# Patient Record
Sex: MALE | Race: WHITE | NOT HISPANIC OR LATINO
[De-identification: names, ages, dates, MRNs, and addresses within clinical notes are randomized per-mention and may not be internally consistent; named-entity substitution may affect disease eponyms.]

---

## 2017-01-09 ENCOUNTER — APPOINTMENT (OUTPATIENT)
Dept: ORTHOPEDIC SURGERY | Facility: CLINIC | Age: 72
End: 2017-01-09

## 2017-01-09 VITALS — DIASTOLIC BLOOD PRESSURE: 67 MMHG | SYSTOLIC BLOOD PRESSURE: 127 MMHG | HEART RATE: 74 BPM

## 2017-01-09 VITALS — WEIGHT: 280 LBS | HEIGHT: 73 IN | BODY MASS INDEX: 37.11 KG/M2

## 2017-01-09 DIAGNOSIS — Z78.9 OTHER SPECIFIED HEALTH STATUS: ICD-10-CM

## 2017-01-09 DIAGNOSIS — Z86.39 PERSONAL HISTORY OF OTHER ENDOCRINE, NUTRITIONAL AND METABOLIC DISEASE: ICD-10-CM

## 2017-02-03 ENCOUNTER — APPOINTMENT (OUTPATIENT)
Dept: ORTHOPEDIC SURGERY | Facility: CLINIC | Age: 72
End: 2017-02-03

## 2017-02-03 DIAGNOSIS — M12.9 ARTHROPATHY, UNSPECIFIED: ICD-10-CM

## 2017-02-03 DIAGNOSIS — M12.811 OTHER SPECIFIC ARTHROPATHIES, NOT ELSEWHERE CLASSIFIED, RIGHT SHOULDER: ICD-10-CM

## 2017-02-03 DIAGNOSIS — G89.29 PAIN IN RIGHT SHOULDER: ICD-10-CM

## 2017-02-03 DIAGNOSIS — M25.511 PAIN IN RIGHT SHOULDER: ICD-10-CM

## 2017-02-14 PROBLEM — M25.511 CHRONIC RIGHT SHOULDER PAIN: Status: ACTIVE | Noted: 2017-01-09

## 2017-02-14 PROBLEM — M12.811 ROTATOR CUFF ARTHROPATHY, RIGHT: Status: ACTIVE | Noted: 2017-02-14

## 2017-02-14 PROBLEM — M12.9 SHOULDER ARTHRITIS: Status: ACTIVE | Noted: 2017-01-09

## 2017-03-06 ENCOUNTER — APPOINTMENT (OUTPATIENT)
Dept: HEART AND VASCULAR | Facility: CLINIC | Age: 72
End: 2017-03-06

## 2017-03-06 VITALS — DIASTOLIC BLOOD PRESSURE: 72 MMHG | HEART RATE: 73 BPM | SYSTOLIC BLOOD PRESSURE: 110 MMHG

## 2017-03-06 VITALS — HEART RATE: 73 BPM | DIASTOLIC BLOOD PRESSURE: 74 MMHG | SYSTOLIC BLOOD PRESSURE: 126 MMHG

## 2017-03-06 DIAGNOSIS — Z01.810 ENCOUNTER FOR PREPROCEDURAL CARDIOVASCULAR EXAMINATION: ICD-10-CM

## 2017-03-15 ENCOUNTER — FORM ENCOUNTER (OUTPATIENT)
Age: 72
End: 2017-03-15

## 2017-03-16 ENCOUNTER — OUTPATIENT (OUTPATIENT)
Dept: OUTPATIENT SERVICES | Facility: HOSPITAL | Age: 72
LOS: 1 days | End: 2017-03-16
Payer: MEDICARE

## 2017-03-16 DIAGNOSIS — E78.5 HYPERLIPIDEMIA, UNSPECIFIED: ICD-10-CM

## 2017-03-16 PROCEDURE — 78452 HT MUSCLE IMAGE SPECT MULT: CPT | Mod: 26

## 2017-03-16 PROCEDURE — 93018 CV STRESS TEST I&R ONLY: CPT

## 2017-03-16 PROCEDURE — A9505: CPT

## 2017-03-16 PROCEDURE — 93017 CV STRESS TEST TRACING ONLY: CPT

## 2017-03-16 PROCEDURE — 93016 CV STRESS TEST SUPVJ ONLY: CPT

## 2017-03-16 PROCEDURE — 78452 HT MUSCLE IMAGE SPECT MULT: CPT

## 2017-03-16 PROCEDURE — A9500: CPT

## 2017-03-20 ENCOUNTER — MESSAGE (OUTPATIENT)
Age: 72
End: 2017-03-20

## 2017-04-06 ENCOUNTER — OTHER (OUTPATIENT)
Age: 72
End: 2017-04-06

## 2017-04-24 ENCOUNTER — RX RENEWAL (OUTPATIENT)
Age: 72
End: 2017-04-24

## 2017-04-24 ENCOUNTER — MEDICATION RENEWAL (OUTPATIENT)
Age: 72
End: 2017-04-24

## 2017-05-02 ENCOUNTER — APPOINTMENT (OUTPATIENT)
Dept: HEART AND VASCULAR | Facility: CLINIC | Age: 72
End: 2017-05-02

## 2017-06-08 ENCOUNTER — APPOINTMENT (OUTPATIENT)
Dept: HEART AND VASCULAR | Facility: CLINIC | Age: 72
End: 2017-06-08

## 2017-06-08 VITALS — DIASTOLIC BLOOD PRESSURE: 68 MMHG | SYSTOLIC BLOOD PRESSURE: 118 MMHG | HEART RATE: 70 BPM

## 2017-06-08 VITALS — HEART RATE: 70 BPM | WEIGHT: 282 LBS | HEIGHT: 73 IN | BODY MASS INDEX: 37.37 KG/M2

## 2017-06-12 LAB
25(OH)D3 SERPL-MCNC: 18.3 NG/ML
ALBUMIN SERPL ELPH-MCNC: 4.7 G/DL
ALP BLD-CCNC: 83 U/L
ALT SERPL-CCNC: 21 U/L
ANION GAP SERPL CALC-SCNC: 16 MMOL/L
AST SERPL-CCNC: 23 U/L
BASOPHILS # BLD AUTO: 0.02 K/UL
BASOPHILS NFR BLD AUTO: 0.3 %
BILIRUB SERPL-MCNC: 0.4 MG/DL
BUN SERPL-MCNC: 16 MG/DL
CALCIUM SERPL-MCNC: 10.3 MG/DL
CHLORIDE SERPL-SCNC: 100 MMOL/L
CHOLEST SERPL-MCNC: 171 MG/DL
CHOLEST/HDLC SERPL: 2.9 RATIO
CO2 SERPL-SCNC: 25 MMOL/L
CREAT SERPL-MCNC: 0.93 MG/DL
CRP SERPL HS-MCNC: 2 MG/L
EOSINOPHIL # BLD AUTO: 0.15 K/UL
EOSINOPHIL NFR BLD AUTO: 2.4 %
GLUCOSE SERPL-MCNC: 153 MG/DL
HBA1C MFR BLD HPLC: 7.1 %
HCT VFR BLD CALC: 45.1 %
HDLC SERPL-MCNC: 60 MG/DL
HGB BLD-MCNC: 13.4 G/DL
IMM GRANULOCYTES NFR BLD AUTO: 0.8 %
LDLC SERPL CALC-MCNC: 70 MG/DL
LYMPHOCYTES # BLD AUTO: 0.99 K/UL
LYMPHOCYTES NFR BLD AUTO: 15.8 %
MAGNESIUM RBC-MCNC: 5 MG/DL
MAN DIFF?: NORMAL
MCHC RBC-ENTMCNC: 27.5 PG
MCHC RBC-ENTMCNC: 29.7 GM/DL
MCV RBC AUTO: 92.4 FL
MONOCYTES # BLD AUTO: 0.68 K/UL
MONOCYTES NFR BLD AUTO: 10.9 %
NEUTROPHILS # BLD AUTO: 4.36 K/UL
NEUTROPHILS NFR BLD AUTO: 69.8 %
PLATELET # BLD AUTO: 232 K/UL
POTASSIUM SERPL-SCNC: 5 MMOL/L
PROT SERPL-MCNC: 7.6 G/DL
RBC # BLD: 4.88 M/UL
RBC # FLD: 14.1 %
SODIUM SERPL-SCNC: 141 MMOL/L
T3FREE SERPL-MCNC: 2.69 PG/ML
T4 FREE SERPL-MCNC: 1 NG/DL
TRIGL SERPL-MCNC: 203 MG/DL
TSH SERPL-ACNC: 2.1 UIU/ML
WBC # FLD AUTO: 6.25 K/UL

## 2017-06-27 ENCOUNTER — RX RENEWAL (OUTPATIENT)
Age: 72
End: 2017-06-27

## 2017-08-07 ENCOUNTER — RX RENEWAL (OUTPATIENT)
Age: 72
End: 2017-08-07

## 2017-10-05 ENCOUNTER — APPOINTMENT (OUTPATIENT)
Dept: HEART AND VASCULAR | Facility: CLINIC | Age: 72
End: 2017-10-05
Payer: MEDICARE

## 2017-10-05 VITALS — HEART RATE: 68 BPM | SYSTOLIC BLOOD PRESSURE: 118 MMHG | DIASTOLIC BLOOD PRESSURE: 82 MMHG

## 2017-10-05 VITALS
WEIGHT: 284 LBS | HEIGHT: 73 IN | HEART RATE: 68 BPM | DIASTOLIC BLOOD PRESSURE: 80 MMHG | SYSTOLIC BLOOD PRESSURE: 120 MMHG | BODY MASS INDEX: 37.64 KG/M2

## 2017-10-05 DIAGNOSIS — I77.89 OTHER SPECIFIED DISORDERS OF ARTERIES AND ARTERIOLES: ICD-10-CM

## 2017-10-05 DIAGNOSIS — R00.2 PALPITATIONS: ICD-10-CM

## 2017-10-05 PROCEDURE — 93000 ELECTROCARDIOGRAM COMPLETE: CPT

## 2017-10-05 PROCEDURE — 36415 COLL VENOUS BLD VENIPUNCTURE: CPT

## 2017-10-05 PROCEDURE — 99214 OFFICE O/P EST MOD 30 MIN: CPT | Mod: 25

## 2017-10-10 ENCOUNTER — RESULT REVIEW (OUTPATIENT)
Age: 72
End: 2017-10-10

## 2017-10-10 LAB
25(OH)D3 SERPL-MCNC: 16.2 NG/ML
ALBUMIN SERPL ELPH-MCNC: 4.7 G/DL
ALP BLD-CCNC: 63 U/L
ALT SERPL-CCNC: 13 U/L
ANION GAP SERPL CALC-SCNC: 16 MMOL/L
AST SERPL-CCNC: 18 U/L
BASOPHILS # BLD AUTO: 0.01 K/UL
BASOPHILS NFR BLD AUTO: 0.2 %
BILIRUB SERPL-MCNC: 0.6 MG/DL
BUN SERPL-MCNC: 18 MG/DL
CALCIUM SERPL-MCNC: 10.2 MG/DL
CHLORIDE SERPL-SCNC: 100 MMOL/L
CHOLEST SERPL-MCNC: 166 MG/DL
CHOLEST/HDLC SERPL: 2.8 RATIO
CO2 SERPL-SCNC: 27 MMOL/L
CREAT SERPL-MCNC: 0.98 MG/DL
CRP SERPL HS-MCNC: 1.4 MG/L
EOSINOPHIL # BLD AUTO: 0.14 K/UL
EOSINOPHIL NFR BLD AUTO: 3.3 %
FOLATE SERPL-MCNC: 8 NG/ML
GLUCOSE SERPL-MCNC: 168 MG/DL
HBA1C MFR BLD HPLC: 7.9 %
HCT VFR BLD CALC: 46 %
HDLC SERPL-MCNC: 59 MG/DL
HGB BLD-MCNC: 14.2 G/DL
IMM GRANULOCYTES NFR BLD AUTO: 0.5 %
LDLC SERPL CALC-MCNC: 60 MG/DL
LYMPHOCYTES # BLD AUTO: 1.03 K/UL
LYMPHOCYTES NFR BLD AUTO: 24.5 %
MAGNESIUM RBC-MCNC: 5.2 MG/DL
MAN DIFF?: NORMAL
MCHC RBC-ENTMCNC: 27.6 PG
MCHC RBC-ENTMCNC: 30.9 GM/DL
MCV RBC AUTO: 89.5 FL
MONOCYTES # BLD AUTO: 0.39 K/UL
MONOCYTES NFR BLD AUTO: 9.3 %
NEUTROPHILS # BLD AUTO: 2.62 K/UL
NEUTROPHILS NFR BLD AUTO: 62.2 %
PLATELET # BLD AUTO: 195 K/UL
POTASSIUM SERPL-SCNC: 4.7 MMOL/L
PROT SERPL-MCNC: 7.6 G/DL
RBC # BLD: 5.14 M/UL
RBC # FLD: 13.5 %
SODIUM SERPL-SCNC: 143 MMOL/L
T3FREE SERPL-MCNC: 2.85 PG/ML
T4 FREE SERPL-MCNC: 1 NG/DL
TRIGL SERPL-MCNC: 233 MG/DL
TSH SERPL-ACNC: 2.75 UIU/ML
VIT B12 SERPL-MCNC: 347 PG/ML
WBC # FLD AUTO: 4.21 K/UL

## 2017-10-18 ENCOUNTER — APPOINTMENT (OUTPATIENT)
Dept: ENDOCRINOLOGY | Facility: CLINIC | Age: 72
End: 2017-10-18
Payer: MEDICARE

## 2017-10-18 VITALS
HEART RATE: 82 BPM | DIASTOLIC BLOOD PRESSURE: 72 MMHG | SYSTOLIC BLOOD PRESSURE: 118 MMHG | BODY MASS INDEX: 38.7 KG/M2 | HEIGHT: 73 IN | WEIGHT: 292 LBS

## 2017-10-18 DIAGNOSIS — B35.1 TINEA UNGUIUM: ICD-10-CM

## 2017-10-18 PROCEDURE — 99203 OFFICE O/P NEW LOW 30 MIN: CPT | Mod: 25,GC

## 2017-10-18 PROCEDURE — G0008: CPT

## 2017-10-18 PROCEDURE — 90662 IIV NO PRSV INCREASED AG IM: CPT

## 2017-10-31 ENCOUNTER — APPOINTMENT (OUTPATIENT)
Dept: ENDOCRINOLOGY | Facility: CLINIC | Age: 72
End: 2017-10-31
Payer: MEDICARE

## 2017-10-31 ENCOUNTER — APPOINTMENT (OUTPATIENT)
Dept: ENDOCRINOLOGY | Facility: CLINIC | Age: 72
End: 2017-10-31

## 2017-10-31 VITALS
SYSTOLIC BLOOD PRESSURE: 127 MMHG | WEIGHT: 291 LBS | BODY MASS INDEX: 38.57 KG/M2 | DIASTOLIC BLOOD PRESSURE: 80 MMHG | HEIGHT: 73 IN | HEART RATE: 81 BPM

## 2017-10-31 PROCEDURE — 99211 OFF/OP EST MAY X REQ PHY/QHP: CPT | Mod: 25

## 2017-10-31 PROCEDURE — 82962 GLUCOSE BLOOD TEST: CPT

## 2017-11-01 RX ORDER — BLOOD-GLUCOSE METER
W/DEVICE EACH MISCELLANEOUS
Qty: 1 | Refills: 0 | Status: ACTIVE | COMMUNITY
Start: 2017-11-01 | End: 1900-01-01

## 2017-11-02 LAB
CREAT SPEC-SCNC: 115 MG/DL
MICROALBUMIN 24H UR DL<=1MG/L-MCNC: 2.5 MG/DL
MICROALBUMIN/CREAT 24H UR-RTO: 22 MG/G

## 2017-12-04 ENCOUNTER — APPOINTMENT (OUTPATIENT)
Dept: ENDOCRINOLOGY | Facility: CLINIC | Age: 72
End: 2017-12-04

## 2018-01-24 ENCOUNTER — APPOINTMENT (OUTPATIENT)
Dept: ENDOCRINOLOGY | Facility: CLINIC | Age: 73
End: 2018-01-24
Payer: MEDICARE

## 2018-01-24 VITALS
WEIGHT: 265.31 LBS | HEART RATE: 85 BPM | BODY MASS INDEX: 35.16 KG/M2 | SYSTOLIC BLOOD PRESSURE: 101 MMHG | HEIGHT: 73 IN | DIASTOLIC BLOOD PRESSURE: 61 MMHG

## 2018-01-24 PROCEDURE — 99214 OFFICE O/P EST MOD 30 MIN: CPT | Mod: 25,GC

## 2018-01-24 PROCEDURE — 83036 HEMOGLOBIN GLYCOSYLATED A1C: CPT | Mod: QW

## 2018-01-24 RX ORDER — CHOLECALCIFEROL (VITAMIN D3) 1250 MCG
1.25 MG CAPSULE ORAL
Qty: 8 | Refills: 0 | Status: ACTIVE | COMMUNITY
Start: 2018-01-24 | End: 1900-01-01

## 2018-01-25 ENCOUNTER — RESULT CHARGE (OUTPATIENT)
Age: 73
End: 2018-01-25

## 2018-01-26 LAB — HBA1C MFR BLD HPLC: 6.4

## 2018-02-01 ENCOUNTER — APPOINTMENT (OUTPATIENT)
Dept: HEART AND VASCULAR | Facility: CLINIC | Age: 73
End: 2018-02-01
Payer: MEDICARE

## 2018-02-01 VITALS — DIASTOLIC BLOOD PRESSURE: 64 MMHG | HEART RATE: 81 BPM | SYSTOLIC BLOOD PRESSURE: 118 MMHG

## 2018-02-01 VITALS
DIASTOLIC BLOOD PRESSURE: 68 MMHG | SYSTOLIC BLOOD PRESSURE: 100 MMHG | HEART RATE: 81 BPM | HEIGHT: 73 IN | WEIGHT: 261 LBS | BODY MASS INDEX: 34.59 KG/M2

## 2018-02-01 DIAGNOSIS — Z00.00 ENCOUNTER FOR GENERAL ADULT MEDICAL EXAMINATION W/OUT ABNORMAL FINDINGS: ICD-10-CM

## 2018-02-01 DIAGNOSIS — I47.1 SUPRAVENTRICULAR TACHYCARDIA: ICD-10-CM

## 2018-02-01 PROCEDURE — 93000 ELECTROCARDIOGRAM COMPLETE: CPT

## 2018-02-01 PROCEDURE — 36415 COLL VENOUS BLD VENIPUNCTURE: CPT

## 2018-02-01 PROCEDURE — 99214 OFFICE O/P EST MOD 30 MIN: CPT | Mod: 25

## 2018-02-02 LAB
25(OH)D3 SERPL-MCNC: 22.6 NG/ML
ALBUMIN SERPL ELPH-MCNC: 4.9 G/DL
ALP BLD-CCNC: 60 U/L
ALT SERPL-CCNC: 13 U/L
ANION GAP SERPL CALC-SCNC: 21 MMOL/L
AST SERPL-CCNC: 18 U/L
BASOPHILS # BLD AUTO: 0.02 K/UL
BASOPHILS NFR BLD AUTO: 0.4 %
BILIRUB SERPL-MCNC: 0.8 MG/DL
BUN SERPL-MCNC: 18 MG/DL
CALCIUM SERPL-MCNC: 10.4 MG/DL
CHLORIDE SERPL-SCNC: 99 MMOL/L
CHOLEST SERPL-MCNC: 138 MG/DL
CHOLEST/HDLC SERPL: 2.1 RATIO
CO2 SERPL-SCNC: 23 MMOL/L
CREAT SERPL-MCNC: 1.14 MG/DL
EOSINOPHIL # BLD AUTO: 0.17 K/UL
EOSINOPHIL NFR BLD AUTO: 3.3 %
FOLATE SERPL-MCNC: 6.7 NG/ML
GLUCOSE SERPL-MCNC: 98 MG/DL
HCT VFR BLD CALC: 46.3 %
HDLC SERPL-MCNC: 65 MG/DL
HGB BLD-MCNC: 14.5 G/DL
IMM GRANULOCYTES NFR BLD AUTO: 0.4 %
LDLC SERPL CALC-MCNC: 44 MG/DL
LYMPHOCYTES # BLD AUTO: 1.04 K/UL
LYMPHOCYTES NFR BLD AUTO: 20.3 %
MAN DIFF?: NORMAL
MCHC RBC-ENTMCNC: 27.5 PG
MCHC RBC-ENTMCNC: 31.3 GM/DL
MCV RBC AUTO: 87.9 FL
MONOCYTES # BLD AUTO: 0.61 K/UL
MONOCYTES NFR BLD AUTO: 11.9 %
NEUTROPHILS # BLD AUTO: 3.26 K/UL
NEUTROPHILS NFR BLD AUTO: 63.7 %
PLATELET # BLD AUTO: 209 K/UL
POTASSIUM SERPL-SCNC: 5 MMOL/L
PROT SERPL-MCNC: 7.7 G/DL
RBC # BLD: 5.27 M/UL
RBC # FLD: 14.9 %
SODIUM SERPL-SCNC: 143 MMOL/L
TRIGL SERPL-MCNC: 145 MG/DL
VIT B12 SERPL-MCNC: 290 PG/ML
WBC # FLD AUTO: 5.12 K/UL

## 2018-02-05 ENCOUNTER — RX RENEWAL (OUTPATIENT)
Age: 73
End: 2018-02-05

## 2018-02-05 LAB — MAGNESIUM RBC-MCNC: 5.1 MG/DL

## 2018-04-03 ENCOUNTER — OUTPATIENT (OUTPATIENT)
Dept: OUTPATIENT SERVICES | Facility: HOSPITAL | Age: 73
LOS: 1 days | End: 2018-04-03
Payer: MEDICARE

## 2018-04-03 DIAGNOSIS — R33.9 RETENTION OF URINE, UNSPECIFIED: ICD-10-CM

## 2018-04-03 DIAGNOSIS — Z01.818 ENCOUNTER FOR OTHER PREPROCEDURAL EXAMINATION: ICD-10-CM

## 2018-04-03 DIAGNOSIS — N40.1 BENIGN PROSTATIC HYPERPLASIA WITH LOWER URINARY TRACT SYMPTOMS: ICD-10-CM

## 2018-04-03 DIAGNOSIS — I10 ESSENTIAL (PRIMARY) HYPERTENSION: ICD-10-CM

## 2018-04-03 DIAGNOSIS — N13.8 OTHER OBSTRUCTIVE AND REFLUX UROPATHY: ICD-10-CM

## 2018-04-03 PROCEDURE — G0463: CPT

## 2018-04-03 PROCEDURE — 85027 COMPLETE CBC AUTOMATED: CPT

## 2018-04-03 PROCEDURE — 80048 BASIC METABOLIC PNL TOTAL CA: CPT

## 2018-04-03 PROCEDURE — 81003 URINALYSIS AUTO W/O SCOPE: CPT

## 2018-04-03 PROCEDURE — 86900 BLOOD TYPING SEROLOGIC ABO: CPT

## 2018-04-03 PROCEDURE — 86850 RBC ANTIBODY SCREEN: CPT

## 2018-04-03 PROCEDURE — 36415 COLL VENOUS BLD VENIPUNCTURE: CPT

## 2018-04-03 PROCEDURE — 87086 URINE CULTURE/COLONY COUNT: CPT

## 2018-04-15 ENCOUNTER — TRANSCRIPTION ENCOUNTER (OUTPATIENT)
Age: 73
End: 2018-04-15

## 2018-04-16 ENCOUNTER — OUTPATIENT (OUTPATIENT)
Dept: OUTPATIENT SERVICES | Facility: HOSPITAL | Age: 73
LOS: 1 days | End: 2018-04-16
Payer: MEDICARE

## 2018-04-16 DIAGNOSIS — N40.1 BENIGN PROSTATIC HYPERPLASIA WITH LOWER URINARY TRACT SYMPTOMS: ICD-10-CM

## 2018-04-16 DIAGNOSIS — R33.9 RETENTION OF URINE, UNSPECIFIED: ICD-10-CM

## 2018-04-16 DIAGNOSIS — N13.8 OTHER OBSTRUCTIVE AND REFLUX UROPATHY: ICD-10-CM

## 2018-04-16 PROCEDURE — 86901 BLOOD TYPING SEROLOGIC RH(D): CPT

## 2018-04-16 PROCEDURE — 86900 BLOOD TYPING SEROLOGIC ABO: CPT

## 2018-04-16 PROCEDURE — 52630 REMOVE PROSTATE REGROWTH: CPT

## 2018-05-09 ENCOUNTER — APPOINTMENT (OUTPATIENT)
Dept: ENDOCRINOLOGY | Facility: CLINIC | Age: 73
End: 2018-05-09
Payer: MEDICARE

## 2018-05-09 VITALS — WEIGHT: 250 LBS | BODY MASS INDEX: 32.98 KG/M2

## 2018-05-09 PROCEDURE — 99214 OFFICE O/P EST MOD 30 MIN: CPT | Mod: GC

## 2018-05-21 ENCOUNTER — RX RENEWAL (OUTPATIENT)
Age: 73
End: 2018-05-21

## 2018-06-27 ENCOUNTER — RX RENEWAL (OUTPATIENT)
Age: 73
End: 2018-06-27

## 2018-07-05 ENCOUNTER — RX RENEWAL (OUTPATIENT)
Age: 73
End: 2018-07-05

## 2018-09-05 ENCOUNTER — APPOINTMENT (OUTPATIENT)
Dept: ENDOCRINOLOGY | Facility: CLINIC | Age: 73
End: 2018-09-05

## 2018-09-05 ENCOUNTER — RESULT CHARGE (OUTPATIENT)
Age: 73
End: 2018-09-05

## 2018-09-05 VITALS
BODY MASS INDEX: 31.66 KG/M2 | WEIGHT: 240 LBS | DIASTOLIC BLOOD PRESSURE: 81 MMHG | SYSTOLIC BLOOD PRESSURE: 124 MMHG | HEART RATE: 84 BPM

## 2018-09-11 LAB
GLUCOSE BLDC GLUCOMTR-MCNC: 104
HBA1C MFR BLD HPLC: 5.9

## 2018-09-19 ENCOUNTER — APPOINTMENT (OUTPATIENT)
Dept: ENDOCRINOLOGY | Facility: CLINIC | Age: 73
End: 2018-09-19
Payer: MEDICARE

## 2018-09-19 ENCOUNTER — RESULT CHARGE (OUTPATIENT)
Age: 73
End: 2018-09-19

## 2018-09-19 VITALS
HEIGHT: 73 IN | BODY MASS INDEX: 32.34 KG/M2 | SYSTOLIC BLOOD PRESSURE: 118 MMHG | DIASTOLIC BLOOD PRESSURE: 74 MMHG | WEIGHT: 244 LBS | HEART RATE: 86 BPM

## 2018-09-19 LAB — GLUCOSE BLDC GLUCOMTR-MCNC: 95

## 2018-09-19 PROCEDURE — 99214 OFFICE O/P EST MOD 30 MIN: CPT | Mod: 25,GC

## 2018-09-19 PROCEDURE — 82962 GLUCOSE BLOOD TEST: CPT

## 2018-12-19 ENCOUNTER — APPOINTMENT (OUTPATIENT)
Dept: ENDOCRINOLOGY | Facility: CLINIC | Age: 73
End: 2018-12-19
Payer: MEDICARE

## 2018-12-19 ENCOUNTER — RESULT CHARGE (OUTPATIENT)
Age: 73
End: 2018-12-19

## 2018-12-19 VITALS
HEART RATE: 83 BPM | SYSTOLIC BLOOD PRESSURE: 134 MMHG | DIASTOLIC BLOOD PRESSURE: 83 MMHG | HEIGHT: 73 IN | WEIGHT: 240 LBS | BODY MASS INDEX: 31.81 KG/M2

## 2018-12-19 DIAGNOSIS — E55.9 VITAMIN D DEFICIENCY, UNSPECIFIED: ICD-10-CM

## 2018-12-19 LAB
GLUCOSE BLDC GLUCOMTR-MCNC: 108
HBA1C MFR BLD HPLC: 5.6

## 2018-12-19 PROCEDURE — 83036 HEMOGLOBIN GLYCOSYLATED A1C: CPT | Mod: QW

## 2018-12-19 PROCEDURE — 99214 OFFICE O/P EST MOD 30 MIN: CPT | Mod: 25,GC

## 2018-12-19 PROCEDURE — 82962 GLUCOSE BLOOD TEST: CPT

## 2018-12-21 LAB
25(OH)D3 SERPL-MCNC: 25.7 NG/ML
ANION GAP SERPL CALC-SCNC: 14 MMOL/L
BUN SERPL-MCNC: 16 MG/DL
CALCIUM SERPL-MCNC: 10 MG/DL
CHLORIDE SERPL-SCNC: 97 MMOL/L
CHOLEST SERPL-MCNC: 117 MG/DL
CHOLEST/HDLC SERPL: 1.9 RATIO
CO2 SERPL-SCNC: 27 MMOL/L
CREAT SERPL-MCNC: 0.81 MG/DL
CREAT SPEC-SCNC: 86 MG/DL
GLUCOSE SERPL-MCNC: 94 MG/DL
HDLC SERPL-MCNC: 61 MG/DL
LDLC SERPL CALC-MCNC: 35 MG/DL
MICROALBUMIN 24H UR DL<=1MG/L-MCNC: <1.2 MG/DL
MICROALBUMIN/CREAT 24H UR-RTO: NORMAL
POTASSIUM SERPL-SCNC: 4.7 MMOL/L
SODIUM SERPL-SCNC: 138 MMOL/L
TRIGL SERPL-MCNC: 103 MG/DL

## 2019-01-14 ENCOUNTER — MEDICATION RENEWAL (OUTPATIENT)
Age: 74
End: 2019-01-14

## 2019-01-14 RX ORDER — LANCETS
EACH MISCELLANEOUS
Qty: 1 | Refills: 5 | Status: ACTIVE | COMMUNITY
Start: 2017-11-01 | End: 1900-01-01

## 2019-01-15 ENCOUNTER — RX RENEWAL (OUTPATIENT)
Age: 74
End: 2019-01-15

## 2019-03-05 ENCOUNTER — RX RENEWAL (OUTPATIENT)
Age: 74
End: 2019-03-05

## 2019-03-20 ENCOUNTER — APPOINTMENT (OUTPATIENT)
Dept: ENDOCRINOLOGY | Facility: CLINIC | Age: 74
End: 2019-03-20
Payer: MEDICARE

## 2019-03-20 ENCOUNTER — RESULT CHARGE (OUTPATIENT)
Age: 74
End: 2019-03-20

## 2019-03-20 VITALS
SYSTOLIC BLOOD PRESSURE: 110 MMHG | WEIGHT: 231 LBS | HEART RATE: 83 BPM | DIASTOLIC BLOOD PRESSURE: 71 MMHG | BODY MASS INDEX: 30.48 KG/M2

## 2019-03-20 LAB
GLUCOSE BLDC GLUCOMTR-MCNC: 99
HBA1C MFR BLD HPLC: 5.4

## 2019-03-20 PROCEDURE — 99214 OFFICE O/P EST MOD 30 MIN: CPT | Mod: 25,GC

## 2019-03-20 PROCEDURE — 82962 GLUCOSE BLOOD TEST: CPT

## 2019-03-20 PROCEDURE — 83036 HEMOGLOBIN GLYCOSYLATED A1C: CPT | Mod: QW

## 2019-03-22 NOTE — END OF VISIT
[FreeTextEntry3] : DM since 2912?\par Pat underwent gastric sleeve, restarted metformin and started GLP1 Oct 2017\par Jan 2018: 265, and March 2019 231. A1c 5.6 % 12/18\par A1c target 6.5% in lower consider strongly to d/c GLP1 [Time Spent: ___ minutes] : I have spent [unfilled] minutes of face to face time with the patient

## 2019-03-22 NOTE — HISTORY OF PRESENT ILLNESS
[FreeTextEntry1] : 72 year male with DM2, obesity presents for follow-up. Overall he has lost 61 lbs.\par \par Current regimen: Metformin 1000 mg BID, Trulicity 1.5 weekly\par \par Pt checks FSG         {1         } times per day:\par \par FSG:\par Pre-breakfast: 84-110s\par Hypoglycemia: none\par \par \par Diet: low carb, high protein, smaller portions\par \par \par Urine micro: WNL 12/08/18 \par lipid profile: LDL 35 on Simvastatin 40 mg 12/08/18\par last hba1c:5.4% 3/2019 5.6%  12/2018  5.9%  9/19/18 6.4% 01/25/2018  7.9 10/05/17\par eye exam: UTD 8/2018\par diabetic foot exam/podiatry: WNL 10/20/17\par

## 2019-03-22 NOTE — ASSESSMENT
[FreeTextEntry1] : A/P: 72 year male with DM2, obesity presents for follow-up.\par \par 1.DM 2- Patient is below goal with his A1C with no episodes of hypoglycemia.. Rec cont Metformin 1000 mg BID and cont Trulicity 1.5/0.5 mg/mL weekly only bc he is cont to lose weight\par \par 2. Obesity- Lost 61 lbs and is tolerating. Cont Trulicity\par \par 3. HLD- LDL within goal cont Simvastatin 40 mg QD\par \par RTC in 3 \par

## 2019-03-22 NOTE — PHYSICAL EXAM
[Alert] : alert [No Acute Distress] : no acute distress [PERRL] : pupils equal, round and reactive to light [EOMI] : extra ocular movement intact [Normal Lips/Gums] : the lips and gums were normal [Supple] : the neck was supple [No Respiratory Distress] : no respiratory distress [Normal Rate and Effort] : normal respiratory rhythm and effort [Clear to Auscultation] : lungs were clear to auscultation bilaterally [Normal Rate] : heart rate was normal  [Normal S1, S2] : normal S1 and S2 [Regular Rhythm] : with a regular rhythm [No Edema] : there was no peripheral edema [Normal Bowel Sounds] : normal bowel sounds [Not Tender] : non-tender [Soft] : abdomen soft [Not Distended] : not distended [No Joint Swelling] : no joint swelling seen [No Rash] : no rash [Oriented x3] : oriented to person, place, and time [de-identified] : overweight

## 2019-07-19 ENCOUNTER — APPOINTMENT (OUTPATIENT)
Dept: ENDOCRINOLOGY | Facility: CLINIC | Age: 74
End: 2019-07-19
Payer: MEDICARE

## 2019-07-19 VITALS
SYSTOLIC BLOOD PRESSURE: 106 MMHG | HEART RATE: 74 BPM | BODY MASS INDEX: 29.69 KG/M2 | HEIGHT: 73 IN | DIASTOLIC BLOOD PRESSURE: 69 MMHG | WEIGHT: 224 LBS

## 2019-07-19 LAB
GLUCOSE BLDC GLUCOMTR-MCNC: 86
HBA1C MFR BLD HPLC: 5.7

## 2019-07-19 PROCEDURE — 99214 OFFICE O/P EST MOD 30 MIN: CPT | Mod: 25,GC

## 2019-07-19 PROCEDURE — 83036 HEMOGLOBIN GLYCOSYLATED A1C: CPT | Mod: QW

## 2019-07-19 PROCEDURE — 82962 GLUCOSE BLOOD TEST: CPT

## 2019-07-19 PROCEDURE — 36415 COLL VENOUS BLD VENIPUNCTURE: CPT

## 2019-07-29 ENCOUNTER — MEDICATION RENEWAL (OUTPATIENT)
Age: 74
End: 2019-07-29

## 2019-07-29 LAB
25(OH)D3 SERPL-MCNC: 26 NG/ML
ALBUMIN SERPL ELPH-MCNC: 4.9 G/DL
ALP BLD-CCNC: 52 U/L
ALT SERPL-CCNC: 11 U/L
ANION GAP SERPL CALC-SCNC: 13 MMOL/L
AST SERPL-CCNC: 18 U/L
BASOPHILS # BLD AUTO: 0.03 K/UL
BASOPHILS NFR BLD AUTO: 0.6 %
BILIRUB SERPL-MCNC: 0.6 MG/DL
BUN SERPL-MCNC: 23 MG/DL
CALCIUM SERPL-MCNC: 10 MG/DL
CHLORIDE SERPL-SCNC: 101 MMOL/L
CHOLEST SERPL-MCNC: 121 MG/DL
CHOLEST/HDLC SERPL: 2.2 RATIO
CO2 SERPL-SCNC: 26 MMOL/L
CREAT SERPL-MCNC: 0.86 MG/DL
CREAT SPEC-SCNC: 206 MG/DL
EOSINOPHIL # BLD AUTO: 0.16 K/UL
EOSINOPHIL NFR BLD AUTO: 3.1 %
GLUCOSE SERPL-MCNC: 84 MG/DL
HCT VFR BLD CALC: 45.1 %
HDLC SERPL-MCNC: 56 MG/DL
HGB BLD-MCNC: 13.6 G/DL
IMM GRANULOCYTES NFR BLD AUTO: 0.4 %
LDLC SERPL CALC-MCNC: 40 MG/DL
LYMPHOCYTES # BLD AUTO: 1.05 K/UL
LYMPHOCYTES NFR BLD AUTO: 20.7 %
MAN DIFF?: NORMAL
MCHC RBC-ENTMCNC: 27.7 PG
MCHC RBC-ENTMCNC: 30.2 GM/DL
MCV RBC AUTO: 91.9 FL
MICROALBUMIN 24H UR DL<=1MG/L-MCNC: 2.6 MG/DL
MICROALBUMIN/CREAT 24H UR-RTO: 13 MG/G
MONOCYTES # BLD AUTO: 0.56 K/UL
MONOCYTES NFR BLD AUTO: 11 %
NEUTROPHILS # BLD AUTO: 3.26 K/UL
NEUTROPHILS NFR BLD AUTO: 64.2 %
PLATELET # BLD AUTO: 195 K/UL
POTASSIUM SERPL-SCNC: 5.3 MMOL/L
PROT SERPL-MCNC: 7.2 G/DL
RBC # BLD: 4.91 M/UL
RBC # FLD: 13.3 %
SODIUM SERPL-SCNC: 140 MMOL/L
TRIGL SERPL-MCNC: 123 MG/DL
WBC # FLD AUTO: 5.08 K/UL

## 2019-10-30 ENCOUNTER — APPOINTMENT (OUTPATIENT)
Dept: ENDOCRINOLOGY | Facility: CLINIC | Age: 74
End: 2019-10-30
Payer: MEDICARE

## 2019-10-30 VITALS
WEIGHT: 231 LBS | SYSTOLIC BLOOD PRESSURE: 109 MMHG | HEART RATE: 82 BPM | BODY MASS INDEX: 30.48 KG/M2 | DIASTOLIC BLOOD PRESSURE: 72 MMHG

## 2019-10-30 PROCEDURE — 99215 OFFICE O/P EST HI 40 MIN: CPT | Mod: 25

## 2019-10-30 PROCEDURE — 82962 GLUCOSE BLOOD TEST: CPT

## 2019-11-01 NOTE — PHYSICAL EXAM
[Alert] : alert [No Acute Distress] : no acute distress [PERRL] : pupils equal, round and reactive to light [EOMI] : extra ocular movement intact [Normal Lips/Gums] : the lips and gums were normal [Supple] : the neck was supple [No Respiratory Distress] : no respiratory distress [Normal Rate and Effort] : normal respiratory rhythm and effort [Clear to Auscultation] : lungs were clear to auscultation bilaterally [Normal Rate] : heart rate was normal  [Normal S1, S2] : normal S1 and S2 [Regular Rhythm] : with a regular rhythm [No Edema] : there was no peripheral edema [Normal Bowel Sounds] : normal bowel sounds [Not Tender] : non-tender [Soft] : abdomen soft [Not Distended] : not distended [No Joint Swelling] : no joint swelling seen [No Rash] : no rash [Oriented x3] : oriented to person, place, and time [de-identified] : overweight [de-identified] : pulses present

## 2019-11-01 NOTE — ADDENDUM
[FreeTextEntry1] : I, Amor Benjamin, acted solely as a scribe for Dr. Mk Lees on this date. 10/30/2019.

## 2019-11-01 NOTE — ASSESSMENT
[Importance of Diet and Exercise] : importance of diet and exercise to improve glycemic control, achieve weight loss and improve cardiovascular health [Self Monitoring of Blood Glucose] : self monitoring of blood glucose [FreeTextEntry1] : 74 y/o M with,\par \par 1. Hx of T2DM dx ~ 10 years ago with no know DM related complications, no symptoms of osmotic diuresis, and recent A1c 5.7%:\par Over the past 4 years, the highest A1c was 7.9% in 10/2017, which occurred once.\par Update labs today. Pt will continue with Trulicity.\par \par 2. Hx of Obesity, pt underwent bariatric surgery 10-15 years ago:\par In 10/2017 pt was not on Trulicity with a weight of 292, today he is 231, lost ~60 lbs. Pt will continue with Metformin and Trulciity.\par Sent for Vit D, metabolic, and electrolyte labs.\par \par f/u with his internist, f/u with me in 6 months.

## 2019-11-01 NOTE — HISTORY OF PRESENT ILLNESS
[FreeTextEntry1] : 12/2018: A1c 5.6%, LDL-c 35\par 3/20/19: POCT A1c 5.4%\par 7/19/19: POCT A1c 5.7%, Vit D 24 OH 26.0, microalb/creat ratio 13, LDL-c 40, s. creat 0.86\par \par 74 y/o M, /72, BMI 30.48, with a hx of T2DM (Dx ~10 years ago) on medication for ~2 years, presents for follow-up. Limited info on DM related complications with increasing A1c since 2014. Known to endocrine clinic.\par Other PMHx: Underwent bariatric surgery, Obesity, and HLD, no Hx of CAD. Denies MI and CHF. Gastric bypass sleeve (>15 years)\par Diet: low carb, high protein, smaller portions\par Pt checks FSG once per day\par eye exam: "couple months ago"\par diabetic foot exam/podiatry: WNL 10/20/17\par Pt is currently following with a cardiologist and an urologist.\par \par 10/30/2019 \par Pt presents today for a DM f/u, feeling well and is asymptomatic. He denies blurry vision, chest pain, palpitation, and pins and needle sensations in the lower extremities. Pt notes he has low appetite but his weight is increasing. Pt gained 7lbs since his last visit in 7/19/19, his  POCT today is 93.\par \par Current Medication; Metformin 500 mg BID, Trulicity 1.5 mg weekly, Simvastatin 40 mg QD, Verapamil

## 2019-11-01 NOTE — REVIEW OF SYSTEMS
[As Noted in HPI] : as noted in HPI [Recent Weight Gain (___ Lbs)] : recent [unfilled] ~Ulb weight gain [Negative] : Constitutional [Blurry Vision] : no blurred vision [Chest Pain] : no chest pain [Palpitations] : no palpitations [FreeTextEntry2] : low appetite  [de-identified] : denies pins and needle sensations in the lower extremities

## 2019-11-01 NOTE — END OF VISIT
[FreeTextEntry3] : All medical record entries made by the Scribe were at my, Dr. Mk Lees, direction and personally dictated by me on 10/30/2019 I have reviewed the chart and agree that the record accurately reflects my personal performance of the history, physical exam, assessment and plan. I have also personally directed, reviewed and agreed with the chart.  [>50% of Time Spent on Counseling for ____] : Greater than 50% of the encounter time was spent on counseling for [unfilled] [Time Spent: ___ minutes] : I have spent [unfilled] minutes of face to face time with the patient

## 2019-11-04 LAB
ANION GAP SERPL CALC-SCNC: 13 MMOL/L
BUN SERPL-MCNC: 19 MG/DL
CALCIUM SERPL-MCNC: 10.3 MG/DL
CHLORIDE SERPL-SCNC: 100 MMOL/L
CO2 SERPL-SCNC: 27 MMOL/L
CREAT SERPL-MCNC: 0.92 MG/DL
ESTIMATED AVERAGE GLUCOSE: 108 MG/DL
GLUCOSE BLDC GLUCOMTR-MCNC: 93
GLUCOSE SERPL-MCNC: 97 MG/DL
HBA1C MFR BLD HPLC: 5.4 %
POTASSIUM SERPL-SCNC: 4.8 MMOL/L
SODIUM SERPL-SCNC: 140 MMOL/L

## 2019-11-18 ENCOUNTER — APPOINTMENT (OUTPATIENT)
Dept: ENDOCRINOLOGY | Facility: CLINIC | Age: 74
End: 2019-11-18

## 2020-07-14 ENCOUNTER — APPOINTMENT (OUTPATIENT)
Dept: ENDOCRINOLOGY | Facility: CLINIC | Age: 75
End: 2020-07-14
Payer: MEDICARE

## 2020-07-14 VITALS
BODY MASS INDEX: 34.06 KG/M2 | HEIGHT: 73 IN | HEART RATE: 82 BPM | WEIGHT: 257 LBS | SYSTOLIC BLOOD PRESSURE: 151 MMHG | DIASTOLIC BLOOD PRESSURE: 81 MMHG

## 2020-07-14 LAB — GLUCOSE BLDC GLUCOMTR-MCNC: 107

## 2020-07-14 PROCEDURE — 36415 COLL VENOUS BLD VENIPUNCTURE: CPT

## 2020-07-14 PROCEDURE — 99214 OFFICE O/P EST MOD 30 MIN: CPT | Mod: 25

## 2020-07-14 PROCEDURE — 82962 GLUCOSE BLOOD TEST: CPT

## 2020-07-14 NOTE — REASON FOR VISIT
[Follow-Up: _____] : a [unfilled] follow-up visit [Follow - Up] : a follow-up visit [DM Type 2] : DM Type 2 [Weight Management/Obesity] : weight management/obesity

## 2020-07-16 NOTE — REVIEW OF SYSTEMS
[As Noted in HPI] : as noted in HPI [Recent Weight Gain (___ Lbs)] : recent weight gain: [unfilled] lbs [Negative] : Psychiatric [Shortness Of Breath] : no shortness of breath [Chest Pain] : no chest pain

## 2020-07-16 NOTE — PHYSICAL EXAM
[Alert] : alert [Normal Sclera/Conjunctiva] : normal sclera/conjunctiva [Normal Outer Ear/Nose] : the ears and nose were normal in appearance [Supple] : the neck was supple [Normal Rate] : heart rate was normal [Normal S1, S2] : normal S1 and S2 [Clear to Auscultation] : lungs were clear to auscultation bilaterally [No Edema] : no peripheral edema [Regular Rhythm] : with a regular rhythm [Pedal Pulses Normal] : the pedal pulses are present [Normal Bowel Sounds] : normal bowel sounds [Normal Gait] : normal gait [No Rash] : no rash [Normal Reflexes] : deep tendon reflexes were 2+ and symmetric [Oriented x3] : oriented to person, place, and time [de-identified] : overweight

## 2020-07-16 NOTE — ASSESSMENT
[Importance of Diet and Exercise] : importance of diet and exercise to improve glycemic control, achieve weight loss and improve cardiovascular health [Self Monitoring of Blood Glucose] : self monitoring of blood glucose [FreeTextEntry1] : 75 y/o M with:\par \par 1. T2DM (dx ~ 10 years ago), with no known DM related complications:\par He is currently on GLP-1  pt's BS has been going up. \par Will order metabolic/lipid profile and A1c today (considering to restart Metformin 1g BID)\par \par 2. Obesity:\par Pt has received weight reduction medications and did a gastric sleeve (> 15 yrs ago). He has a period where he maintains his weight, however, it increases and decreases afterwards. He recently regained ~20lbs 2/2 stay home orders for COVID. Recommend pt go over basic weight loss principals, continue GLP-1 and restart Metformin 1g BID. RD visit.\par Will order labs today (will call pt with results and recommendations) \par \par Return in 3 months

## 2020-07-16 NOTE — END OF VISIT
[Time Spent: ___ minutes] : I have spent [unfilled] minutes of time on the encounter. [>50% of the face to face encounter time was spent on counseling and/or coordination of care for ___] : Greater than 50% of the face to face encounter time was spent on counseling and/or coordination of care for [unfilled] [FreeTextEntry3] : All medical record entries made by the Scribe were at my, Dr. Mk Lees, direction and personally dictated by me on 07/14/2020. I have reviewed the chart and agree that the record accurately reflects my personal performance of the history, physical exam, assessment and plan. I have also personally directed, reviewed and agreed with the chart.

## 2020-07-16 NOTE — ADDENDUM
[FreeTextEntry1] : I, Max Sawyer, acted solely as a scribe for Dr. Mk Lees on this date. 07/14/2020.

## 2020-07-16 NOTE — CONSULT LETTER
[Consult Letter:] : I had the pleasure of evaluating your patient, [unfilled]. [Dear  ___] : Dear  [unfilled], [Consult Closing:] : Thank you very much for allowing me to participate in the care of this patient.  If you have any questions, please do not hesitate to contact me. [FreeTextEntry3] : Nabeel Terrazas

## 2020-07-16 NOTE — HISTORY OF PRESENT ILLNESS
[FreeTextEntry1] : 12/2018: A1c 5.6%, LDL-c 35\par 3/20/19: POCT A1c 5.4%\par 7/19/19: POCT A1c 5.7%, Vit D 24 OH 26.0, microalb/creat ratio 13, LDL-c 40, s. creat 0.86\par 10/30/19 A1c 5.4%, s. creat 0.92,\par \par 73 y/o M, /81, BMI 33.91, with a hx of T2DM (dx ~10 years ago, on medication for ~2 years).\par Limited info on DM related complications with increasing A1c since 2014. Known to endocrine clinic.\par Other PMHx: Obesity, HTN and HLD\par Denies CAD, MI and CHF. \par PSHx: Gastric bypass sleeve (>15 years)\par Pt checks FSG once per day\par Pt is currently following with a cardiologist and an urologist.\par \par 7/14/20\par Pt's weight fluctuates, he lost 10-60lbs at different times in the past few years. He has no known hx of DM complications. Pt has been at home and hasn't been following his diet, as a result he has gained ~20lbs. \par \par Today pt presents for DM f/u with POCT 107, feeling well. Pt states is on Trulicity, and has not been taking Metformin.\par Pt gained 26lbs since his last visit here in 10/2019. \par Denies SOB, and CP.\par \par Current Medication: Trulicity 1.5 mg weekly, Simvastatin 40 mg QD, Verapamil , Colesevelam Hydrochloride 625mg, Ropinirole HCl 2mg, Sertraline HCl 25mg

## 2020-07-17 ENCOUNTER — APPOINTMENT (OUTPATIENT)
Dept: ENDOCRINOLOGY | Facility: CLINIC | Age: 75
End: 2020-07-17
Payer: MEDICARE

## 2020-07-17 DIAGNOSIS — E66.9 OBESITY, UNSPECIFIED: ICD-10-CM

## 2020-07-17 PROCEDURE — 99214 OFFICE O/P EST MOD 30 MIN: CPT | Mod: 95

## 2020-07-19 NOTE — END OF VISIT
[FreeTextEntry3] : All medical record entries made by the Scribe were at my, Dr. Mk Lees, direction and personally dictated by me on 07/17/2020. I have reviewed the chart and agree that the record accurately reflects my personal performance of the history, physical exam, assessment and plan. I have also personally directed, reviewed and agreed with the chart.  [Time Spent: ___ minutes] : I have spent [unfilled] minutes of time on the encounter.

## 2020-07-19 NOTE — ADDENDUM
[FreeTextEntry1] : I, Sathya Dang, acted solely as a scribe for Dr. Mk Lees on this date. 07/17/2020.

## 2020-07-19 NOTE — HISTORY OF PRESENT ILLNESS
[Medical Office: ([facility])___] : at the medical office located in  [Home] : at home, [unfilled] , at the time of the visit. [Verbal consent obtained from patient] : the patient, [unfilled] [FreeTextEntry1] : 73 y/o M, with Hx of T2DM (dx ~10 years ago, on medication for ~2 years). Limited info on DM related complications with increasing A1c since 2014.\par Other PMHx: Obesity, HTN and HLD\par Denies CAD, MI and CHF. \par PSHx: Gastric bypass sleeve (>15 years)\par \par 7/14/20\par Pt's weight fluctuates, he lost 10-60lbs at different times in the past few years. He has no known hx of DM complications. Pt has been at home and hasn't been following his diet, as a result he has gained ~20lbs. \par Today pt presents for DM f/u with POCT 107, feeling well. Pt states is on Trulicity, and has not been taking Metformin.\par Pt gained 26lbs since his last visit here in 10/2019. \par Denies SOB, and CP.\par \par 07/17/2020 \par Review of recent labs on 7/15/20: A1c 5.9%, s.creat 0.95, , LDL-c 54, TSH 1.67\par \par Pt did not have any questions prior to the initiation of the telehealth visit. \par Pt presents today with his wife via telehealth, feeling well with no acute complaints. \par Denies SOB and CP. \par \par Current Medication: Trulicity 1.5 mg QW, Simvastatin 40 mg QD, Verapamil , Colesevelam Hydrochloride 6.25 mg, Ropinirole HCl 2 mg, Sertraline HCl 25 mg \par \par Lab studies:\par - 7/15/20 A1c 5.9%, ldl-c 54, TSH 1.67\par - 10/30/19: A1c 5.4%, s.creat 0.92\par - 7/19/19: POCT A1c 5.7%, s.creat 0.86, LDL-c 40,Microalb/Creat 13,  Vit D 25-OH 26.0\par - 12/2018: A1c 5.6%, LDL-c 35

## 2020-07-19 NOTE — ASSESSMENT
[Carbohydrate Consistent Diet] : carbohydrate consistent diet [Importance of Diet and Exercise] : importance of diet and exercise to improve glycemic control, achieve weight loss and improve cardiovascular health [FreeTextEntry1] : 75 y/o M pt with:\par \par 1. Hx of T2DM:\par Pt is asymptomatic. Recent A1c 5.9%. No hypoglycemia.\par Will continue with Trulicity injections weekly. \par \par 2. Hx of obesity:\par Over the past 8 months, he has gained 25 lbs. \par Benefit of weight loss explained. He is familiar with weight loss measures. \par Will continue with GLP-1. \par Follow up with RD. \par \par Return in: 6 months.

## 2020-08-06 LAB
ALBUMIN SERPL ELPH-MCNC: 5 G/DL
ALP BLD-CCNC: 64 U/L
ALT SERPL-CCNC: 17 U/L
ANION GAP SERPL CALC-SCNC: 15 MMOL/L
AST SERPL-CCNC: 27 U/L
BILIRUB SERPL-MCNC: 0.6 MG/DL
BUN SERPL-MCNC: 18 MG/DL
CALCIUM SERPL-MCNC: 9.8 MG/DL
CHLORIDE SERPL-SCNC: 100 MMOL/L
CHOLEST SERPL-MCNC: 136 MG/DL
CHOLEST/HDLC SERPL: 2.5 RATIO
CO2 SERPL-SCNC: 27 MMOL/L
CREAT SERPL-MCNC: 0.95 MG/DL
ESTIMATED AVERAGE GLUCOSE: 123 MG/DL
GLUCOSE SERPL-MCNC: 94 MG/DL
HBA1C MFR BLD HPLC: 5.9 %
HDLC SERPL-MCNC: 54 MG/DL
LDLC SERPL CALC-MCNC: 54 MG/DL
POTASSIUM SERPL-SCNC: 4.6 MMOL/L
PROT SERPL-MCNC: 7.5 G/DL
SODIUM SERPL-SCNC: 141 MMOL/L
TRIGL SERPL-MCNC: 138 MG/DL
TSH SERPL-ACNC: 1.67 UIU/ML

## 2021-04-23 RX ORDER — BLOOD SUGAR DIAGNOSTIC
STRIP MISCELLANEOUS TWICE DAILY
Qty: 1 | Refills: 2 | Status: ACTIVE | COMMUNITY
Start: 2020-01-17 | End: 1900-01-01

## 2021-04-24 ENCOUNTER — RX RENEWAL (OUTPATIENT)
Age: 76
End: 2021-04-24

## 2021-04-30 ENCOUNTER — APPOINTMENT (OUTPATIENT)
Dept: ENDOCRINOLOGY | Facility: CLINIC | Age: 76
End: 2021-04-30
Payer: MEDICARE

## 2021-04-30 DIAGNOSIS — E11.9 TYPE 2 DIABETES MELLITUS W/OUT COMPLICATIONS: ICD-10-CM

## 2021-04-30 PROCEDURE — 99213 OFFICE O/P EST LOW 20 MIN: CPT | Mod: 95

## 2021-04-30 RX ORDER — BLOOD-GLUCOSE METER
W/DEVICE EACH MISCELLANEOUS
Qty: 1 | Refills: 0 | Status: ACTIVE | COMMUNITY
Start: 2021-04-30 | End: 1900-01-01

## 2021-04-30 NOTE — END OF VISIT
[FreeTextEntry3] : All medical record entries made by the Scribe were at my, Dr. Mk Lees, direction and personally dictated by me on 04/30/2021. I have reviewed the chart and agree that the record accurately reflects my personal performance of the history, physical exam, assessment and plan. I have also personally directed, reviewed and agreed with the chart.  [Time Spent: ___ minutes] : I have spent [unfilled] minutes of time on the encounter.

## 2021-04-30 NOTE — REVIEW OF SYSTEMS
[Decreased Appetite] : decreased appetite [As Noted in HPI] : as noted in HPI [Anxiety] : anxiety [Negative] : Heme/Lymph [Nocturia] : no nocturia [FreeTextEntry9] : General soreness in body

## 2021-04-30 NOTE — HISTORY OF PRESENT ILLNESS
[Home] : at home, [unfilled] , at the time of the visit. [Medical Office: ([facility])___] : at the medical office located in  [Verbal consent obtained from patient] : the patient, [unfilled] [FreeTextEntry1] : 76 y/o M, with Hx of T2DM (dx ~10 years ago, on medication for ~2 years). Limited info on DM related complications with increasing A1c since 2014.\par Other PMHx: Obesity, HTN and HLD\par Denies CAD, MI and CHF. \par PSHx: Gastric bypass sleeve (>15 years)\par \par 7/14/20\par Pt's weight fluctuates, he lost 10-60lbs at different times in the past few years. He has no known hx of DM complications. Pt has been at home and hasn't been following his diet, as a result he has gained ~20lbs. \par Today pt presents for DM f/u with POCT 107, feeling well. Pt states is on Trulicity, and has not been taking Metformin.\par Pt gained 26lbs since his last visit here in 10/2019. \par Denies SOB, and CP.\par \par 07/17/2020 \par Review of recent labs on 7/15/20: A1c 5.9%, s.creat 0.95, , LDL-c 54, TSH 1.67\par \par Pt did not have any questions prior to the initiation of the telehealth visit. \par Pt presents today with his wife via telehealth, feeling well with no acute complaints. \par Denies SOB and CP. \par \par 4/30/21\par Pt did not have any questions prior to the initiation of the telehealth visit.   \par Pt has a problem with his hearing and did not have hearing aide in during the visit. \par Pt presents today via telehealth, accompanied by wife, feeling good but c/o feeling soreness in his body and having no appetite. \par Pt has lost weight 20 lbs over the past 5 months because his severe anxiety caused him to not eat. In the past 3 months he started eating a bit. Yesterday, he had appetite for cake. \par To manage his anxiety, pt has video calls with psychiatrist once a week, and therapist twice a month. Pt is also taking Valium and Sertraline 100 mg qd. \par Today . Yesterday FBS 95\par Yesterday post prandial 105\par Denies nocturia. \par \par Current Medication: Metformin 500 mg bid,  Sertraline HCl 25 mg, ASA, Losartan, Gabapentin, Buspirone(for anxiety), Valium, Simvastatin 40 mg QD, Verapamil , Colesevelam Hydrochloride 6.25 mg, Ropinirole HCl 2 mg,\par \par Lab studies:\par - 7/15/20 A1c 5.9%, LDL-c 54, TSH 1.67\par - 10/30/19: A1c 5.4%, s.creat 0.92\par - 7/19/19: POCT A1c 5.7%, s.creat 0.86, LDL-c 40,Microalb/Creat 13, Vit D 25-OH 26.0\par - 12/2018: A1c 5.6%, LDL-c 35

## 2021-04-30 NOTE — ADDENDUM
[FreeTextEntry1] : I, Jason Albarado, acted solely as a scribe for Dr. Mk Lees on this date. 04/30/2021.

## 2021-04-30 NOTE — ASSESSMENT
[Carbohydrate Consistent Diet] : carbohydrate consistent diet [Importance of Diet and Exercise] : importance of diet and exercise to improve glycemic control, achieve weight loss and improve cardiovascular health [Self Monitoring of Blood Glucose] : self monitoring of blood glucose [FreeTextEntry1] : 76 y/o M pt with:\par \par 1. Hx of T2DM, fairly controlled\par FBS below 130 \par Because of anxiety, pt lost 20 lbs last year. He is recovering 70% of it. \par Continue monotherapy, Metformin 500 mg bid. \par Send for blood test.\par \par Return in: 4 months.

## 2021-12-09 ENCOUNTER — INPATIENT (INPATIENT)
Facility: HOSPITAL | Age: 76
LOS: 4 days | Discharge: HOME HEALTH | End: 2021-12-14
Attending: HOSPITALIST | Admitting: HOSPITALIST
Payer: MEDICARE

## 2021-12-09 VITALS
RESPIRATION RATE: 20 BRPM | HEART RATE: 82 BPM | OXYGEN SATURATION: 97 % | TEMPERATURE: 98 F | DIASTOLIC BLOOD PRESSURE: 71 MMHG | SYSTOLIC BLOOD PRESSURE: 104 MMHG

## 2021-12-09 DIAGNOSIS — I21.4 NON-ST ELEVATION (NSTEMI) MYOCARDIAL INFARCTION: ICD-10-CM

## 2021-12-09 LAB
ALBUMIN SERPL ELPH-MCNC: 4.4 G/DL — SIGNIFICANT CHANGE UP (ref 3.3–5)
ALP SERPL-CCNC: 63 U/L — SIGNIFICANT CHANGE UP (ref 40–120)
ALT FLD-CCNC: 15 U/L — SIGNIFICANT CHANGE UP (ref 4–41)
ANION GAP SERPL CALC-SCNC: 18 MMOL/L — HIGH (ref 7–14)
APTT BLD: 33 SEC — SIGNIFICANT CHANGE UP (ref 27–36.3)
AST SERPL-CCNC: 25 U/L — SIGNIFICANT CHANGE UP (ref 4–40)
B PERT DNA SPEC QL NAA+PROBE: SIGNIFICANT CHANGE UP
B PERT+PARAPERT DNA PNL SPEC NAA+PROBE: SIGNIFICANT CHANGE UP
BASOPHILS # BLD AUTO: 0.02 K/UL — SIGNIFICANT CHANGE UP (ref 0–0.2)
BASOPHILS NFR BLD AUTO: 0.4 % — SIGNIFICANT CHANGE UP (ref 0–2)
BILIRUB SERPL-MCNC: 0.7 MG/DL — SIGNIFICANT CHANGE UP (ref 0.2–1.2)
BLOOD GAS VENOUS COMPREHENSIVE RESULT: SIGNIFICANT CHANGE UP
BORDETELLA PARAPERTUSSIS (RAPRVP): SIGNIFICANT CHANGE UP
BUN SERPL-MCNC: 26 MG/DL — HIGH (ref 7–23)
C PNEUM DNA SPEC QL NAA+PROBE: SIGNIFICANT CHANGE UP
CALCIUM SERPL-MCNC: 9.7 MG/DL — SIGNIFICANT CHANGE UP (ref 8.4–10.5)
CHLORIDE SERPL-SCNC: 101 MMOL/L — SIGNIFICANT CHANGE UP (ref 98–107)
CO2 SERPL-SCNC: 21 MMOL/L — LOW (ref 22–31)
CREAT SERPL-MCNC: 0.95 MG/DL — SIGNIFICANT CHANGE UP (ref 0.5–1.3)
EOSINOPHIL # BLD AUTO: 0.11 K/UL — SIGNIFICANT CHANGE UP (ref 0–0.5)
EOSINOPHIL NFR BLD AUTO: 2.1 % — SIGNIFICANT CHANGE UP (ref 0–6)
FLUAV SUBTYP SPEC NAA+PROBE: SIGNIFICANT CHANGE UP
FLUBV RNA SPEC QL NAA+PROBE: SIGNIFICANT CHANGE UP
GLUCOSE SERPL-MCNC: 93 MG/DL — SIGNIFICANT CHANGE UP (ref 70–99)
HADV DNA SPEC QL NAA+PROBE: SIGNIFICANT CHANGE UP
HCOV 229E RNA SPEC QL NAA+PROBE: SIGNIFICANT CHANGE UP
HCOV HKU1 RNA SPEC QL NAA+PROBE: SIGNIFICANT CHANGE UP
HCOV NL63 RNA SPEC QL NAA+PROBE: SIGNIFICANT CHANGE UP
HCOV OC43 RNA SPEC QL NAA+PROBE: SIGNIFICANT CHANGE UP
HCT VFR BLD CALC: 42.5 % — SIGNIFICANT CHANGE UP (ref 39–50)
HGB BLD-MCNC: 13.2 G/DL — SIGNIFICANT CHANGE UP (ref 13–17)
HMPV RNA SPEC QL NAA+PROBE: SIGNIFICANT CHANGE UP
HPIV1 RNA SPEC QL NAA+PROBE: SIGNIFICANT CHANGE UP
HPIV2 RNA SPEC QL NAA+PROBE: SIGNIFICANT CHANGE UP
HPIV3 RNA SPEC QL NAA+PROBE: SIGNIFICANT CHANGE UP
HPIV4 RNA SPEC QL NAA+PROBE: SIGNIFICANT CHANGE UP
IANC: 3.43 K/UL — SIGNIFICANT CHANGE UP (ref 1.5–8.5)
IMM GRANULOCYTES NFR BLD AUTO: 1 % — SIGNIFICANT CHANGE UP (ref 0–1.5)
INR BLD: 1.33 RATIO — HIGH (ref 0.88–1.16)
LYMPHOCYTES # BLD AUTO: 0.81 K/UL — LOW (ref 1–3.3)
LYMPHOCYTES # BLD AUTO: 15.6 % — SIGNIFICANT CHANGE UP (ref 13–44)
M PNEUMO DNA SPEC QL NAA+PROBE: SIGNIFICANT CHANGE UP
MAGNESIUM SERPL-MCNC: 1.7 MG/DL — SIGNIFICANT CHANGE UP (ref 1.6–2.6)
MCHC RBC-ENTMCNC: 26 PG — LOW (ref 27–34)
MCHC RBC-ENTMCNC: 31.1 GM/DL — LOW (ref 32–36)
MCV RBC AUTO: 83.7 FL — SIGNIFICANT CHANGE UP (ref 80–100)
MONOCYTES # BLD AUTO: 0.78 K/UL — SIGNIFICANT CHANGE UP (ref 0–0.9)
MONOCYTES NFR BLD AUTO: 15 % — HIGH (ref 2–14)
NEUTROPHILS # BLD AUTO: 3.43 K/UL — SIGNIFICANT CHANGE UP (ref 1.8–7.4)
NEUTROPHILS NFR BLD AUTO: 65.9 % — SIGNIFICANT CHANGE UP (ref 43–77)
NRBC # BLD: 0 /100 WBCS — SIGNIFICANT CHANGE UP
NRBC # FLD: 0 K/UL — SIGNIFICANT CHANGE UP
NT-PROBNP SERPL-SCNC: 104 PG/ML — SIGNIFICANT CHANGE UP
PLATELET # BLD AUTO: 233 K/UL — SIGNIFICANT CHANGE UP (ref 150–400)
POTASSIUM SERPL-MCNC: 4.5 MMOL/L — SIGNIFICANT CHANGE UP (ref 3.5–5.3)
POTASSIUM SERPL-SCNC: 4.5 MMOL/L — SIGNIFICANT CHANGE UP (ref 3.5–5.3)
PROT SERPL-MCNC: 7.7 G/DL — SIGNIFICANT CHANGE UP (ref 6–8.3)
PROTHROM AB SERPL-ACNC: 15 SEC — HIGH (ref 10.6–13.6)
RAPID RVP RESULT: DETECTED
RBC # BLD: 5.08 M/UL — SIGNIFICANT CHANGE UP (ref 4.2–5.8)
RBC # FLD: 14.1 % — SIGNIFICANT CHANGE UP (ref 10.3–14.5)
RSV RNA SPEC QL NAA+PROBE: SIGNIFICANT CHANGE UP
RV+EV RNA SPEC QL NAA+PROBE: SIGNIFICANT CHANGE UP
SARS-COV-2 RNA SPEC QL NAA+PROBE: DETECTED
SODIUM SERPL-SCNC: 140 MMOL/L — SIGNIFICANT CHANGE UP (ref 135–145)
TROPONIN T, HIGH SENSITIVITY RESULT: 132 NG/L — CRITICAL HIGH
TROPONIN T, HIGH SENSITIVITY RESULT: 138 NG/L — CRITICAL HIGH
TSH SERPL-MCNC: 1.12 UIU/ML — SIGNIFICANT CHANGE UP (ref 0.27–4.2)
WBC # BLD: 5.2 K/UL — SIGNIFICANT CHANGE UP (ref 3.8–10.5)
WBC # FLD AUTO: 5.2 K/UL — SIGNIFICANT CHANGE UP (ref 3.8–10.5)

## 2021-12-09 PROCEDURE — 99285 EMERGENCY DEPT VISIT HI MDM: CPT | Mod: CS,25

## 2021-12-09 PROCEDURE — 71275 CT ANGIOGRAPHY CHEST: CPT | Mod: 26,MA

## 2021-12-09 PROCEDURE — 99221 1ST HOSP IP/OBS SF/LOW 40: CPT

## 2021-12-09 PROCEDURE — 71045 X-RAY EXAM CHEST 1 VIEW: CPT | Mod: 26

## 2021-12-09 PROCEDURE — 93010 ELECTROCARDIOGRAM REPORT: CPT

## 2021-12-09 PROCEDURE — 99223 1ST HOSP IP/OBS HIGH 75: CPT

## 2021-12-09 RX ORDER — DEXTROSE 50 % IN WATER 50 %
12.5 SYRINGE (ML) INTRAVENOUS ONCE
Refills: 0 | Status: DISCONTINUED | OUTPATIENT
Start: 2021-12-09 | End: 2021-12-14

## 2021-12-09 RX ORDER — HEPARIN SODIUM 5000 [USP'U]/ML
6000 INJECTION INTRAVENOUS; SUBCUTANEOUS EVERY 6 HOURS
Refills: 0 | Status: DISCONTINUED | OUTPATIENT
Start: 2021-12-09 | End: 2021-12-10

## 2021-12-09 RX ORDER — SODIUM CHLORIDE 9 MG/ML
1000 INJECTION, SOLUTION INTRAVENOUS
Refills: 0 | Status: DISCONTINUED | OUTPATIENT
Start: 2021-12-09 | End: 2021-12-14

## 2021-12-09 RX ORDER — ASPIRIN/CALCIUM CARB/MAGNESIUM 324 MG
81 TABLET ORAL DAILY
Refills: 0 | Status: DISCONTINUED | OUTPATIENT
Start: 2021-12-09 | End: 2021-12-14

## 2021-12-09 RX ORDER — TRAZODONE HCL 50 MG
50 TABLET ORAL AT BEDTIME
Refills: 0 | Status: DISCONTINUED | OUTPATIENT
Start: 2021-12-09 | End: 2021-12-13

## 2021-12-09 RX ORDER — SIMVASTATIN 20 MG/1
0 TABLET, FILM COATED ORAL
Qty: 0 | Refills: 0 | DISCHARGE

## 2021-12-09 RX ORDER — DIAZEPAM 5 MG
5 TABLET ORAL
Refills: 0 | Status: DISCONTINUED | OUTPATIENT
Start: 2021-12-09 | End: 2021-12-11

## 2021-12-09 RX ORDER — PRUCALOPRIDE 2 MG/1
0 TABLET, FILM COATED ORAL
Qty: 0 | Refills: 0 | DISCHARGE

## 2021-12-09 RX ORDER — ALBUTEROL 90 UG/1
0 AEROSOL, METERED ORAL
Qty: 0 | Refills: 0 | DISCHARGE

## 2021-12-09 RX ORDER — ACETAMINOPHEN 500 MG
650 TABLET ORAL EVERY 6 HOURS
Refills: 0 | Status: DISCONTINUED | OUTPATIENT
Start: 2021-12-09 | End: 2021-12-14

## 2021-12-09 RX ORDER — HEPARIN SODIUM 5000 [USP'U]/ML
INJECTION INTRAVENOUS; SUBCUTANEOUS
Qty: 25000 | Refills: 0 | Status: DISCONTINUED | OUTPATIENT
Start: 2021-12-09 | End: 2021-12-10

## 2021-12-09 RX ORDER — OXYCODONE HYDROCHLORIDE 5 MG/1
5 TABLET ORAL
Refills: 0 | Status: DISCONTINUED | OUTPATIENT
Start: 2021-12-09 | End: 2021-12-10

## 2021-12-09 RX ORDER — INSULIN LISPRO 100/ML
VIAL (ML) SUBCUTANEOUS
Refills: 0 | Status: DISCONTINUED | OUTPATIENT
Start: 2021-12-09 | End: 2021-12-14

## 2021-12-09 RX ORDER — ALBUTEROL 90 UG/1
2 AEROSOL, METERED ORAL EVERY 6 HOURS
Refills: 0 | Status: DISCONTINUED | OUTPATIENT
Start: 2021-12-09 | End: 2021-12-10

## 2021-12-09 RX ORDER — LOSARTAN POTASSIUM 100 MG/1
100 TABLET, FILM COATED ORAL DAILY
Refills: 0 | Status: DISCONTINUED | OUTPATIENT
Start: 2021-12-09 | End: 2021-12-14

## 2021-12-09 RX ORDER — HEPARIN SODIUM 5000 [USP'U]/ML
5000 INJECTION INTRAVENOUS; SUBCUTANEOUS ONCE
Refills: 0 | Status: COMPLETED | OUTPATIENT
Start: 2021-12-09 | End: 2021-12-09

## 2021-12-09 RX ORDER — GLUCAGON INJECTION, SOLUTION 0.5 MG/.1ML
1 INJECTION, SOLUTION SUBCUTANEOUS ONCE
Refills: 0 | Status: DISCONTINUED | OUTPATIENT
Start: 2021-12-09 | End: 2021-12-14

## 2021-12-09 RX ORDER — DEXTROSE 50 % IN WATER 50 %
25 SYRINGE (ML) INTRAVENOUS ONCE
Refills: 0 | Status: DISCONTINUED | OUTPATIENT
Start: 2021-12-09 | End: 2021-12-14

## 2021-12-09 RX ORDER — CELECOXIB 200 MG/1
200 CAPSULE ORAL DAILY
Refills: 0 | Status: DISCONTINUED | OUTPATIENT
Start: 2021-12-09 | End: 2021-12-10

## 2021-12-09 RX ORDER — DEXTROSE 50 % IN WATER 50 %
15 SYRINGE (ML) INTRAVENOUS ONCE
Refills: 0 | Status: DISCONTINUED | OUTPATIENT
Start: 2021-12-09 | End: 2021-12-14

## 2021-12-09 RX ORDER — SIMVASTATIN 20 MG/1
20 TABLET, FILM COATED ORAL AT BEDTIME
Refills: 0 | Status: DISCONTINUED | OUTPATIENT
Start: 2021-12-09 | End: 2021-12-10

## 2021-12-09 RX ORDER — INSULIN LISPRO 100/ML
VIAL (ML) SUBCUTANEOUS AT BEDTIME
Refills: 0 | Status: DISCONTINUED | OUTPATIENT
Start: 2021-12-09 | End: 2021-12-14

## 2021-12-09 RX ORDER — METFORMIN HYDROCHLORIDE 850 MG/1
0 TABLET ORAL
Qty: 0 | Refills: 0 | DISCHARGE

## 2021-12-09 RX ORDER — FENTANYL CITRATE 50 UG/ML
50 INJECTION INTRAVENOUS ONCE
Refills: 0 | Status: DISCONTINUED | OUTPATIENT
Start: 2021-12-09 | End: 2021-12-09

## 2021-12-09 RX ORDER — SERTRALINE 25 MG/1
100 TABLET, FILM COATED ORAL DAILY
Refills: 0 | Status: DISCONTINUED | OUTPATIENT
Start: 2021-12-09 | End: 2021-12-14

## 2021-12-09 RX ORDER — ASPIRIN/CALCIUM CARB/MAGNESIUM 324 MG
324 TABLET ORAL ONCE
Refills: 0 | Status: COMPLETED | OUTPATIENT
Start: 2021-12-09 | End: 2021-12-09

## 2021-12-09 RX ORDER — SODIUM CHLORIDE 9 MG/ML
3 INJECTION INTRAMUSCULAR; INTRAVENOUS; SUBCUTANEOUS EVERY 8 HOURS
Refills: 0 | Status: DISCONTINUED | OUTPATIENT
Start: 2021-12-09 | End: 2021-12-14

## 2021-12-09 RX ADMIN — HEPARIN SODIUM 1000 UNIT(S)/HR: 5000 INJECTION INTRAVENOUS; SUBCUTANEOUS at 21:00

## 2021-12-09 RX ADMIN — HEPARIN SODIUM 5000 UNIT(S): 5000 INJECTION INTRAVENOUS; SUBCUTANEOUS at 21:00

## 2021-12-09 RX ADMIN — FENTANYL CITRATE 50 MICROGRAM(S): 50 INJECTION INTRAVENOUS at 18:35

## 2021-12-09 RX ADMIN — FENTANYL CITRATE 50 MICROGRAM(S): 50 INJECTION INTRAVENOUS at 19:13

## 2021-12-09 RX ADMIN — Medication 324 MILLIGRAM(S): at 18:21

## 2021-12-09 NOTE — H&P ADULT - PROBLEM SELECTOR PLAN 10
VTW covered with Heparin drip.  Fall, Aspiration, safety and seizure precautions.  BH, Cardio eval. Continue Oxy IR BID, Tylenol and COX2 inhibitor. patient not complaining of pain right now, hold opiates and nsaid for now

## 2021-12-09 NOTE — H&P ADULT - ATTENDING COMMENTS
no This is a 76 y/o M with pmhx of HTN, DM type 2, HLD, anxiety, asbestos was tested covid pos on 11/20 presented to the ED for shortness of breath. The patient appears anxious at bedside, and has could not provide a good history in regards to his symptoms. He states that he has SOB for a “couple of months”. SOB associated with exertion. Denies chest pain. He also states that he cannot sleep for the last 4 days because he has shortness of breath when he lays down. The only lung disease he endorsed to me was that he has emphysema and asbestos. 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 Denies follow up with outpatient lung doctor. I also spoke with the wife who sent him to the ED. She reports that the patient is very severe anxiety and suspects his SOB is due to an anxiety episode. He takes valium for anxiety and has been taking it more often because his anxiety is getting harder to control. He started acting like this 2-3 weeks ago when he was diagnosed with covid. He is vaccinated. The patient had endorsed that he “wants to take all his sleeping pills”, which prompted his outpatient psychiatrist to send him to the ED for evaluation.     Physical exam shows an anxious elderly male, not in acute respiratory distress, lungs CTA b/l no wheezing or crackles but has mild decreased breath sounds b/l, cardiac s1s2 RRR no murmurs, abdomen soft nontender to palpation b/l, no LE edema.    Labs show normal CBC, chem 7 wnl, troponins elevated 132, 138. COVID19 positive. CTA shows no obvious PE but has a few groundglass opacities concerning for inflammation vs. infection. No EKG ST changes.    The patient is admitted for NSTEMI and work up of shortness of breath. Will start heparin drip for NSTEMI. Obtain Echo for evaluation of myocardial infarction. Would start high dose statins. Tele monitoring. Cardiology consult appreciated, will need angiogram. In regards to the SOB, it could be anxiety related, however will need to rule out medical causes. CT only shows pleural plaques which is expected from hx of asbestos exposure. No fibrosis noted. CTA neg for PE. Will give trial of duonebs and monitor for symptom resolution. Pulmonary consult for further recommendations. 1:1 observation.

## 2021-12-09 NOTE — H&P ADULT - ASSESSMENT
75M history of OA, HTN, DM2, Hyperlipidemia, Depression, Anxiety, admitted for SI with pills, chest pain with elevated Troponin started on a Heparin drip.

## 2021-12-09 NOTE — H&P ADULT - MUSCULOSKELETAL
detailed exam no joint swelling/no joint erythema/no joint warmth/no calf tenderness/normal strength details…

## 2021-12-09 NOTE — H&P ADULT - PROBLEM SELECTOR PLAN 1
Cardiac monitor.  Currently chest pain free.   Continue Heparin drip started in the ED.  Give O2 via NC PRN, ASA, ARB and statin.  F/U #3 CE and lipid panel.  F/U cardio consult: too see the pt later today 12/10.  NPO except meds for possible study Cardiac monitor.  Currently chest pain free.   Continue Heparin drip started in the ED.  Give O2 via NC PRN, ASA, ARB and statin.  F/U #3 CE and lipid panel.  F/U cardio consult: too see the pt later today 12/10.  NPO except meds for possible study.  Cardio consult apprecaited: ASA, High intensity statin   metoprolol 25mg XL   echocardiogram  ischemia eval, with invasive angiogram.

## 2021-12-09 NOTE — H&P ADULT - HISTORY OF PRESENT ILLNESS
History provided by the pt with collateral information and current home medications supplied by emergency contact / spouse Vesta Orozco [phone].    75M ambulates with a walker due to OA, history of HTN, DM2, Hyperlipidemia, Depression, Anxiety, insomnia, diagnosed with COVID 11/2021 and since has been experiencing intermittent, nonexertional SOB, cough with white phlegm, SOTO after a few feet, intermittent, substernal chest pain, unable to describe, but has a max intensity of 6/10 and radiates to L arm, decreased appetite, constipation, last BM days ago, the pt says he asked his wife for all of his sleeping pills 12/9 AM, because he was "tired of the way he was living." But denies suicide ideation or attempt. Denies HA, dizziness, falls, nausea, vomit, diarrhea, abdominal pain, dysuria, chills, diaphoresis, generalized body aches.      Spoke with the spouse Vesta Orozco @ [phone]. She says, since the pt was diagnosed with COVID 11/2021 his anxiety has increased, says sometimes calls out that he feels SOB and call 911. She said she is use to this and feels he is not SOB but having an anxiety episode. But on 12/9, he asked he for all of his sleeping pills because he wanted to take them all at once. The spouse call their psychiatrist and was advised to take him to the ED.    in the ED, the pt found with elevated Trop 132> 138, EKG SR @ 65b/ min, PVC. He was started on a Heparin drip, cardiology called and will the pt.     Vitals:  History provided by the pt with collateral information and current home medications supplied by emergency contact / spouse Vesta Orozco [phone].    75M ambulates with a walker due to OA, history of HTN, DM2, Hyperlipidemia, Depression, Anxiety, insomnia, diagnosed with COVID 11/2021 and since has been experiencing intermittent, nonexertional SOB, cough with white phlegm, SOTO after a few feet, intermittent, substernal chest pain, unable to describe, but has a max intensity of 6/10 and radiates to L arm, decreased appetite, constipation, last BM days ago, the pt says he asked his wife for all of his sleeping pills 12/9 AM, because he was "tired of the way he was living." But denies suicide ideation or attempt. Denies HA, dizziness, falls, nausea, vomit, diarrhea, abdominal pain, dysuria, chills, diaphoresis, generalized body aches.      Spoke with the spouse Vesta Orozco @ [phone]. She says, since the pt was diagnosed with COVID 11/2021 his anxiety has increased, says sometimes calls out that he feels SOB and call 911. She said she is use to this and feels he is not SOB but having an anxiety episode. But on 12/9, he asked he for all of his sleeping pills because he wanted to take them all at once. The spouse call their psychiatrist and was advised to take him to the ED.    in the ED, the pt found with elevated Trop 132> 138, EKG SR @ 65b/ min, PVC. He was started on a Heparin drip, cardiology called and will the pt.     Vitals: temp = 97.1*, HR = 80b/ min, BP = 140/ 77, RR= 19b/ min, Spo2= 100% ra

## 2021-12-09 NOTE — H&P ADULT - NSHPLABSRESULTS_GEN_ALL_CORE
- EKG SR @ 65b/ min , PVC, QT/ QTC= 446/ 463.  - TROP - 132> 138  - BNP= 104.  - TSH 1.12  - COVID Positive.  -  vPH= 7.48  - vLactate= 1.6    - CXR Calcified pleural plaques consistent with asbestos exposure. No acute pulmonary disease. No pneumothorax nor pleural effusion.  - CTPA Limited evaluation . No pulmonary arterial emboli within the well opacified pulmonary arteries. Few bilateral upper lung ill-defined small groundglass nodular opacities   new since 6/17/2016 may represent small foci of infection/inflammation.      CBC Full  -  ( 09 Dec 2021 17:25 )  WBC Count : 5.20 K/uL  RBC Count : 5.08 M/uL  Hemoglobin : 13.2 g/dL  Hematocrit : 42.5 %  Platelet Count - Automated : 233 K/uL  Mean Cell Volume : 83.7 fL  Mean Cell Hemoglobin : 26.0 pg  Mean Cell Hemoglobin Concentration : 31.1 gm/dL  Auto Neutrophil # : 3.43 K/uL  Auto Lymphocyte # : 0.81 K/uL  Auto Monocyte # : 0.78 K/uL  Auto Eosinophil # : 0.11 K/uL  Auto Basophil # : 0.02 K/uL  Auto Neutrophil % : 65.9 %  Auto Lymphocyte % : 15.6 %  Auto Monocyte % : 15.0 %  Auto Eosinophil % : 2.1 %  Auto Basophil % : 0.4 %    12-09    140  |  101  |  26<H>  ----------------------------<  93  4.5   |  21<L>  |  0.95    Ca    9.7      09 Dec 2021 17:25  Mg     1.70     12-09    TPro  7.7  /  Alb  4.4  /  TBili  0.7  /  DBili  x   /  AST  25  /  ALT  15  /  AlkPhos  63  12-09

## 2021-12-09 NOTE — ED ADULT NURSE NOTE - OBJECTIVE STATEMENT
received pt in bed A and Ox 3 in NAD c/o SOB, noted pt to have shallow rapid respirations, noted pt to have ketosis like smell on breath, pt denies N/V denies c/p, reports was diagnosed with COVID 4 weeks ago. lung sounds with crackles in lower fields B/L, communicates with full and complete sentences, no cyanosis noted, pt  is 100% O2 sat on RA, placed on o2 NC at 2 PM for comfort with notable decrease in work of breathing, however, pt reports no improvement, abd soft non distended non tender, no pedal edema noted, pt denies SI, Hi or AVH but states " I am done with this I just want to take my pills and go to sleep: as per spouse pt requested to be medicated today so he would never wake up. pt dressed into hospital attire, belongings given to spouse, with valuables, fall precautions initiated, call light within reach, pt constantly observed from nrusing station siotuated across from pt's room.

## 2021-12-09 NOTE — H&P ADULT - NEGATIVE ENMT SYMPTOMS
no tinnitus/no vertigo/no sinus symptoms/no nasal congestion/no nasal discharge/no nasal obstruction/no dysphagia

## 2021-12-09 NOTE — CONSULT NOTE ADULT - PROBLEM SELECTOR RECOMMENDATION 9
Myocarditis vs ACS   Currently chest pain free, no evidence of ADHF, no life threatening arrhthymias  continue with heparin drip, keep PTT within therapeutic range   ASA, High intensity statin   metoprolol 25mg XL   echocardiogram  ischemia eval, with invasive angiogram.   COVID care by primary team.   Cardiology service will continue to follow.

## 2021-12-09 NOTE — H&P ADULT - NSICDXPASTSURGICALHX_GEN_ALL_CORE_FT
PAST SURGICAL HISTORY:  H/O gastric bypass     History of bunionectomy R foot.    History of left knee replacement

## 2021-12-09 NOTE — ED PROVIDER NOTE - CARE PLAN
Principal Discharge DX:	Non-ST elevation MI (NSTEMI)  Secondary Diagnosis:	Suicidal ideation  Secondary Diagnosis:	2019 novel coronavirus disease (COVID-19)   1

## 2021-12-09 NOTE — ED ADULT NURSE REASSESSMENT NOTE - NS ED NURSE REASSESS COMMENT FT1
received report from dayshift RN. Pt is a/o x 3.Pt has 18g iv placed to right and left AC with no redness or swelling note Pt started on heparin drip at 10 ml/hr as per  ACS nomogram. Pt and family educated on signs and symptoms of bleeding with proper feed back. No complaints of nausea, dizziness, vomiting  SOB, fever, chills verbalized. pt on cardiac monitor in NSR,. Vitals as per flowsheet.

## 2021-12-09 NOTE — H&P ADULT - PROBLEM SELECTOR PLAN 2
place on 1 to 1.   call and VM left on [id number].  Keep sharps and shoe laces/ strings away from patient.

## 2021-12-09 NOTE — ED PROVIDER NOTE - OBJECTIVE STATEMENT
75M pmhx of anxiety, HTN, type 2 DM, HLD, abestos exposure, covid 1 month ago presents to the ED for chest tightness, shortness of breath worsening anxiety x 4 days, stating he just wants to take all of his sleeping pills.

## 2021-12-09 NOTE — ED PROVIDER NOTE - PROGRESS NOTE DETAILS
George Gracia D.O., PGY3 (Resident)  trop elevated. Cr 0.95. 1st ekg w/o diagnostic ischemic ekg changes. ASA given. Rpt ekg again w/o diagnostic ekg changes. Cards paged for NSTEMI c/s. George Gracia D.O., PGY3 (Resident)  Per cards, start hep ggtt, order echo. Patient and family aware of plan. 1:1 for suicidal ideations. Will need inpatient psych eval. Discussed with hospitalist.

## 2021-12-09 NOTE — CONSULT NOTE ADULT - RS GEN PE MLT RESP DETAILS PC
1) Disimpaction phase is necessary before initiation of maintenance therapy: use 3 measuring cups of Mirlax daily for 3 days (better during weekend) to achieve clean out of the colon from hard stool matter. Please assure adequate hydration during this phase. The expectation is to have significant amount of large hard stool mixed with loose stool during this phase with only loose stool toward the third day of treatment.   2) Maintenance Therapy: after disimpaction phase, start with 1 measuring cup (17 gm) of Mirlax daily. If you start having very watery/loose stools, decrease the dose slowly by 1/2 measuring cup (8.5gm) of Mirlax.  If you are still constipated, increase to twice a day.    
rales

## 2021-12-09 NOTE — ED ADULT TRIAGE NOTE - CHIEF COMPLAINT QUOTE
As per wife, pt. was advised to come to ER for psych eval. Pt. has been having increased anxiety and panic attacks. c/o chest tightness, sob, headaches and dry mouth. Pt. has been staying in bed longer and today wanted to take all his sleeping pills. Tested positive for covid a month ago.

## 2021-12-09 NOTE — ED PROVIDER NOTE - ATTENDING CONTRIBUTION TO CARE
DR. BLOCH, ATTENDING MD-  I performed a face to face bedside interview with patient regarding history of present illness, review of symptoms and past medical history. I completed an independent physical exam.  I have discussed patient's plan of care with the resident.  Patient anxious well appearing, Mildly SOB but O2 sat 100% on room air, HEENT nml, heart s1s2, 2/6 RAQUEL. crackles at right base, abd soft nontender, extrem no edema, sensation intact. pulses intact.

## 2021-12-09 NOTE — CONSULT NOTE ADULT - SUBJECTIVE AND OBJECTIVE BOX
75M poor historian, with hx of HTN, DM, obesity, COVID 19 (a month ago) and anxiety who  comes to the ED c/o of progressive shortness of breath, chest tightness, dry cough and fatigue for the past month. He denies leg swelling, no orthopnea no PND. Per records with suicidal thoughts, when asked, patient reports all he wants is to sleep because he feels tired but he denies self harming ideas.   In the ED patient EKG without ischemic changes, Hs trop 132 >>138, Pro-, CT angio with no evidence of PE. VBG  normal lactic acid.   Currently chest pain free.

## 2021-12-09 NOTE — H&P ADULT - PROBLEM SELECTOR PLAN 4
Continue Diazepam 5 mg po BID.  Seizure precautions. Per spouse, pt's last BM may have been weeks ago.   Constipation likely due to Oxy IR for OA  - Lactulose po x 1.  - Starting Miralax and Senna

## 2021-12-09 NOTE — H&P ADULT - NSHPSOCIALHISTORY_GEN_ALL_CORE
.  Lives with the spouse.  Denies Nicotine.  Denies Illicit/ recreational drug use.  ETOH < 1 x  a month.  Retired [employer].  Flu Vax: 2021  COVID Vax: 2021  Colonoscopy: 2021: Normal.

## 2021-12-09 NOTE — ED PROVIDER NOTE - CLINICAL SUMMARY MEDICAL DECISION MAKING FREE TEXT BOX
75M pmhx of anxiety, HTN, type 2 DM, HLD, abestos exposure, covid 1 month ago with suicidal ideation today, SOB, anxiety , chest tightness. concern for MI, PE, suicidal thought. will check labs, CT angio chest, troponin. and admit. Will put on CO.

## 2021-12-09 NOTE — H&P ADULT - NSICDXPASTMEDICALHX_GEN_ALL_CORE_FT
PAST MEDICAL HISTORY:  2019 novel coronavirus disease (COVID-19)     Anxiety     Diabetes mellitus     Essential hypertension     Hyperlipidemia     Insomnia     Other depression

## 2021-12-09 NOTE — H&P ADULT - PROBLEM SELECTOR PLAN 3
Per spouse, pt's last BM may have been weeks ago.   Constipation likely due to Oxy IR for OA  - Lactulose po x 1.  - Starting Miralax and Senna multifactorial.  Likely due to anxiety with cardiac component, COVID PNA 11/20,  history of asbestos exposure.   CTPA Few bilateral upper lung ill-defined small groundglass nodular opacities   new since 6/17/2016 may represent small foci of infection/ inflammation.  - Will start Xopenex Nebs Q6* PRN multifactorial.  Likely due to anxiety with cardiac component, COVID PNA 11/20,  history of asbestos exposure.   CTPA Few bilateral upper lung ill-defined small groundglass nodular opacities and pleural plaques  new since 6/17/2016 may represent small foci of infection/ inflammation.  - Will start Xopenex Nebs Q6* PRN  - pulmonary consult

## 2021-12-10 DIAGNOSIS — R39.11 HESITANCY OF MICTURITION: ICD-10-CM

## 2021-12-10 DIAGNOSIS — R77.8 OTHER SPECIFIED ABNORMALITIES OF PLASMA PROTEINS: ICD-10-CM

## 2021-12-10 DIAGNOSIS — M19.90 UNSPECIFIED OSTEOARTHRITIS, UNSPECIFIED SITE: ICD-10-CM

## 2021-12-10 DIAGNOSIS — F41.9 ANXIETY DISORDER, UNSPECIFIED: ICD-10-CM

## 2021-12-10 DIAGNOSIS — Z98.84 BARIATRIC SURGERY STATUS: Chronic | ICD-10-CM

## 2021-12-10 DIAGNOSIS — Z98.890 OTHER SPECIFIED POSTPROCEDURAL STATES: Chronic | ICD-10-CM

## 2021-12-10 DIAGNOSIS — R06.02 SHORTNESS OF BREATH: ICD-10-CM

## 2021-12-10 DIAGNOSIS — E11.9 TYPE 2 DIABETES MELLITUS WITHOUT COMPLICATIONS: ICD-10-CM

## 2021-12-10 DIAGNOSIS — R45.851 SUICIDAL IDEATIONS: ICD-10-CM

## 2021-12-10 DIAGNOSIS — E78.5 HYPERLIPIDEMIA, UNSPECIFIED: ICD-10-CM

## 2021-12-10 DIAGNOSIS — Z96.652 PRESENCE OF LEFT ARTIFICIAL KNEE JOINT: Chronic | ICD-10-CM

## 2021-12-10 DIAGNOSIS — Z29.9 ENCOUNTER FOR PROPHYLACTIC MEASURES, UNSPECIFIED: ICD-10-CM

## 2021-12-10 DIAGNOSIS — K59.00 CONSTIPATION, UNSPECIFIED: ICD-10-CM

## 2021-12-10 DIAGNOSIS — F32.89 OTHER SPECIFIED DEPRESSIVE EPISODES: ICD-10-CM

## 2021-12-10 DIAGNOSIS — I10 ESSENTIAL (PRIMARY) HYPERTENSION: ICD-10-CM

## 2021-12-10 LAB
24R-OH-CALCIDIOL SERPL-MCNC: 28.4 NG/ML — LOW (ref 30–80)
A1C WITH ESTIMATED AVERAGE GLUCOSE RESULT: 6.1 % — HIGH (ref 4–5.6)
ANION GAP SERPL CALC-SCNC: 14 MMOL/L — SIGNIFICANT CHANGE UP (ref 7–14)
APTT BLD: 46.1 SEC — HIGH (ref 27–36.3)
APTT BLD: 62.7 SEC — HIGH (ref 27–36.3)
APTT BLD: 66.1 SEC — HIGH (ref 27–36.3)
BUN SERPL-MCNC: 23 MG/DL — SIGNIFICANT CHANGE UP (ref 7–23)
CALCIUM SERPL-MCNC: 9.7 MG/DL — SIGNIFICANT CHANGE UP (ref 8.4–10.5)
CHLORIDE SERPL-SCNC: 102 MMOL/L — SIGNIFICANT CHANGE UP (ref 98–107)
CHOLEST SERPL-MCNC: 131 MG/DL — SIGNIFICANT CHANGE UP
CK MB BLD-MCNC: 6.7 % — HIGH (ref 0–2.5)
CK MB CFR SERPL CALC: 9.8 NG/ML — HIGH
CK SERPL-CCNC: 146 U/L — SIGNIFICANT CHANGE UP (ref 30–200)
CO2 SERPL-SCNC: 24 MMOL/L — SIGNIFICANT CHANGE UP (ref 22–31)
CREAT SERPL-MCNC: 0.86 MG/DL — SIGNIFICANT CHANGE UP (ref 0.5–1.3)
CRP SERPL-MCNC: <4 MG/L — SIGNIFICANT CHANGE UP
ESTIMATED AVERAGE GLUCOSE: 128 — SIGNIFICANT CHANGE UP
FERRITIN SERPL-MCNC: 94 NG/ML — SIGNIFICANT CHANGE UP (ref 30–400)
GLUCOSE BLDC GLUCOMTR-MCNC: 116 MG/DL — HIGH (ref 70–99)
GLUCOSE BLDC GLUCOMTR-MCNC: 92 MG/DL — SIGNIFICANT CHANGE UP (ref 70–99)
GLUCOSE BLDC GLUCOMTR-MCNC: 96 MG/DL — SIGNIFICANT CHANGE UP (ref 70–99)
GLUCOSE SERPL-MCNC: 77 MG/DL — SIGNIFICANT CHANGE UP (ref 70–99)
HCT VFR BLD CALC: 41.3 % — SIGNIFICANT CHANGE UP (ref 39–50)
HCT VFR BLD CALC: 41.5 % — SIGNIFICANT CHANGE UP (ref 39–50)
HDLC SERPL-MCNC: 40 MG/DL — LOW
HGB BLD-MCNC: 12.7 G/DL — LOW (ref 13–17)
HGB BLD-MCNC: 12.9 G/DL — LOW (ref 13–17)
LACTATE SERPL-SCNC: 0.9 MMOL/L — SIGNIFICANT CHANGE UP (ref 0.5–2)
LDH SERPL L TO P-CCNC: 235 U/L — HIGH (ref 135–225)
LIPID PNL WITH DIRECT LDL SERPL: 75 MG/DL — SIGNIFICANT CHANGE UP
MAGNESIUM SERPL-MCNC: 1.7 MG/DL — SIGNIFICANT CHANGE UP (ref 1.6–2.6)
MCHC RBC-ENTMCNC: 25.7 PG — LOW (ref 27–34)
MCHC RBC-ENTMCNC: 25.8 PG — LOW (ref 27–34)
MCHC RBC-ENTMCNC: 30.6 GM/DL — LOW (ref 32–36)
MCHC RBC-ENTMCNC: 31.2 GM/DL — LOW (ref 32–36)
MCV RBC AUTO: 82.6 FL — SIGNIFICANT CHANGE UP (ref 80–100)
MCV RBC AUTO: 83.8 FL — SIGNIFICANT CHANGE UP (ref 80–100)
NON HDL CHOLESTEROL: 91 MG/DL — SIGNIFICANT CHANGE UP
NRBC # BLD: 0 /100 WBCS — SIGNIFICANT CHANGE UP
NRBC # BLD: 0 /100 WBCS — SIGNIFICANT CHANGE UP
NRBC # FLD: 0 K/UL — SIGNIFICANT CHANGE UP
NRBC # FLD: 0 K/UL — SIGNIFICANT CHANGE UP
PHOSPHATE SERPL-MCNC: 2.5 MG/DL — SIGNIFICANT CHANGE UP (ref 2.5–4.5)
PLATELET # BLD AUTO: 215 K/UL — SIGNIFICANT CHANGE UP (ref 150–400)
PLATELET # BLD AUTO: 236 K/UL — SIGNIFICANT CHANGE UP (ref 150–400)
POTASSIUM SERPL-MCNC: 3.8 MMOL/L — SIGNIFICANT CHANGE UP (ref 3.5–5.3)
POTASSIUM SERPL-SCNC: 3.8 MMOL/L — SIGNIFICANT CHANGE UP (ref 3.5–5.3)
PROCALCITONIN SERPL-MCNC: 0.06 NG/ML — SIGNIFICANT CHANGE UP (ref 0.02–0.1)
RBC # BLD: 4.95 M/UL — SIGNIFICANT CHANGE UP (ref 4.2–5.8)
RBC # BLD: 5 M/UL — SIGNIFICANT CHANGE UP (ref 4.2–5.8)
RBC # FLD: 14.2 % — SIGNIFICANT CHANGE UP (ref 10.3–14.5)
RBC # FLD: 14.6 % — HIGH (ref 10.3–14.5)
SODIUM SERPL-SCNC: 140 MMOL/L — SIGNIFICANT CHANGE UP (ref 135–145)
TRIGL SERPL-MCNC: 82 MG/DL — SIGNIFICANT CHANGE UP
TROPONIN T, HIGH SENSITIVITY RESULT: 141 NG/L — CRITICAL HIGH
WBC # BLD: 5.09 K/UL — SIGNIFICANT CHANGE UP (ref 3.8–10.5)
WBC # BLD: 5.34 K/UL — SIGNIFICANT CHANGE UP (ref 3.8–10.5)
WBC # FLD AUTO: 5.09 K/UL — SIGNIFICANT CHANGE UP (ref 3.8–10.5)
WBC # FLD AUTO: 5.34 K/UL — SIGNIFICANT CHANGE UP (ref 3.8–10.5)

## 2021-12-10 PROCEDURE — 99232 SBSQ HOSP IP/OBS MODERATE 35: CPT | Mod: GC

## 2021-12-10 PROCEDURE — 99233 SBSQ HOSP IP/OBS HIGH 50: CPT

## 2021-12-10 PROCEDURE — 90792 PSYCH DIAG EVAL W/MED SRVCS: CPT

## 2021-12-10 RX ORDER — TAMSULOSIN HYDROCHLORIDE 0.4 MG/1
0.4 CAPSULE ORAL AT BEDTIME
Refills: 0 | Status: DISCONTINUED | OUTPATIENT
Start: 2021-12-10 | End: 2021-12-14

## 2021-12-10 RX ORDER — SENNA PLUS 8.6 MG/1
2 TABLET ORAL AT BEDTIME
Refills: 0 | Status: DISCONTINUED | OUTPATIENT
Start: 2021-12-10 | End: 2021-12-14

## 2021-12-10 RX ORDER — POLYETHYLENE GLYCOL 3350 17 G/17G
17 POWDER, FOR SOLUTION ORAL DAILY
Refills: 0 | Status: DISCONTINUED | OUTPATIENT
Start: 2021-12-10 | End: 2021-12-11

## 2021-12-10 RX ORDER — ATORVASTATIN CALCIUM 80 MG/1
80 TABLET, FILM COATED ORAL AT BEDTIME
Refills: 0 | Status: DISCONTINUED | OUTPATIENT
Start: 2021-12-10 | End: 2021-12-14

## 2021-12-10 RX ORDER — LACTULOSE 10 G/15ML
20 SOLUTION ORAL ONCE
Refills: 0 | Status: COMPLETED | OUTPATIENT
Start: 2021-12-10 | End: 2021-12-10

## 2021-12-10 RX ORDER — FLUTICASONE PROPIONATE 50 MCG
1 SPRAY, SUSPENSION NASAL
Refills: 0 | Status: DISCONTINUED | OUTPATIENT
Start: 2021-12-10 | End: 2021-12-14

## 2021-12-10 RX ORDER — INFLUENZA VIRUS VACCINE 15; 15; 15; 15 UG/.5ML; UG/.5ML; UG/.5ML; UG/.5ML
0.7 SUSPENSION INTRAMUSCULAR ONCE
Refills: 0 | Status: DISCONTINUED | OUTPATIENT
Start: 2021-12-10 | End: 2021-12-14

## 2021-12-10 RX ORDER — METOPROLOL TARTRATE 50 MG
25 TABLET ORAL DAILY
Refills: 0 | Status: DISCONTINUED | OUTPATIENT
Start: 2021-12-10 | End: 2021-12-14

## 2021-12-10 RX ORDER — HEPARIN SODIUM 5000 [USP'U]/ML
4000 INJECTION INTRAVENOUS; SUBCUTANEOUS EVERY 6 HOURS
Refills: 0 | Status: DISCONTINUED | OUTPATIENT
Start: 2021-12-10 | End: 2021-12-11

## 2021-12-10 RX ORDER — HEPARIN SODIUM 5000 [USP'U]/ML
INJECTION INTRAVENOUS; SUBCUTANEOUS
Qty: 25000 | Refills: 0 | Status: DISCONTINUED | OUTPATIENT
Start: 2021-12-10 | End: 2021-12-11

## 2021-12-10 RX ORDER — LEVALBUTEROL 1.25 MG/.5ML
0.63 SOLUTION, CONCENTRATE RESPIRATORY (INHALATION) EVERY 6 HOURS
Refills: 0 | Status: DISCONTINUED | OUTPATIENT
Start: 2021-12-10 | End: 2021-12-14

## 2021-12-10 RX ORDER — LANOLIN ALCOHOL/MO/W.PET/CERES
3 CREAM (GRAM) TOPICAL AT BEDTIME
Refills: 0 | Status: DISCONTINUED | OUTPATIENT
Start: 2021-12-10 | End: 2021-12-14

## 2021-12-10 RX ADMIN — Medication 1 SPRAY(S): at 13:32

## 2021-12-10 RX ADMIN — LACTULOSE 20 GRAM(S): 10 SOLUTION ORAL at 00:45

## 2021-12-10 RX ADMIN — SERTRALINE 100 MILLIGRAM(S): 25 TABLET, FILM COATED ORAL at 17:14

## 2021-12-10 RX ADMIN — HEPARIN SODIUM 1000 UNIT(S)/HR: 5000 INJECTION INTRAVENOUS; SUBCUTANEOUS at 04:02

## 2021-12-10 RX ADMIN — HEPARIN SODIUM 1200 UNIT(S)/HR: 5000 INJECTION INTRAVENOUS; SUBCUTANEOUS at 19:35

## 2021-12-10 RX ADMIN — Medication 50 MILLIGRAM(S): at 22:14

## 2021-12-10 RX ADMIN — POLYETHYLENE GLYCOL 3350 17 GRAM(S): 17 POWDER, FOR SOLUTION ORAL at 12:55

## 2021-12-10 RX ADMIN — LOSARTAN POTASSIUM 100 MILLIGRAM(S): 100 TABLET, FILM COATED ORAL at 05:16

## 2021-12-10 RX ADMIN — Medication 81 MILLIGRAM(S): at 12:55

## 2021-12-10 RX ADMIN — Medication 100 MILLIGRAM(S): at 22:14

## 2021-12-10 RX ADMIN — Medication 3 MILLIGRAM(S): at 22:14

## 2021-12-10 RX ADMIN — Medication 1 SPRAY(S): at 21:47

## 2021-12-10 RX ADMIN — SODIUM CHLORIDE 3 MILLILITER(S): 9 INJECTION INTRAMUSCULAR; INTRAVENOUS; SUBCUTANEOUS at 16:06

## 2021-12-10 RX ADMIN — TAMSULOSIN HYDROCHLORIDE 0.4 MILLIGRAM(S): 0.4 CAPSULE ORAL at 22:15

## 2021-12-10 RX ADMIN — HEPARIN SODIUM 1200 UNIT(S)/HR: 5000 INJECTION INTRAVENOUS; SUBCUTANEOUS at 23:59

## 2021-12-10 RX ADMIN — SODIUM CHLORIDE 3 MILLILITER(S): 9 INJECTION INTRAMUSCULAR; INTRAVENOUS; SUBCUTANEOUS at 22:48

## 2021-12-10 RX ADMIN — SODIUM CHLORIDE 3 MILLILITER(S): 9 INJECTION INTRAMUSCULAR; INTRAVENOUS; SUBCUTANEOUS at 05:21

## 2021-12-10 RX ADMIN — Medication 25 MILLIGRAM(S): at 05:16

## 2021-12-10 RX ADMIN — HEPARIN SODIUM 1000 UNIT(S)/HR: 5000 INJECTION INTRAVENOUS; SUBCUTANEOUS at 12:51

## 2021-12-10 RX ADMIN — ATORVASTATIN CALCIUM 80 MILLIGRAM(S): 80 TABLET, FILM COATED ORAL at 22:15

## 2021-12-10 NOTE — BH CONSULTATION LIAISON ASSESSMENT NOTE - HPI (INCLUDE ILLNESS QUALITY, SEVERITY, DURATION, TIMING, CONTEXT, MODIFYING FACTORS, ASSOCIATED SIGNS AND SYMPTOMS)
74 y/o M, , living with wife, daughter, and grandchildren in a house in [place], past psychiatric history of anxiety/depression (sees outpt psychiatrist Dr. Robertson in [place]),no known substance or alcohol abuse, no known prior suicide attempts w/ PMH HTN, DM2, HLD,  diagnosed with COVID 11/2021 presents to the ED with nonexertional SOB, cough with white phlegm, substernal CP that radiates to L arm. On that same day, pt requested his wife to give him all of his sleeping pills on 12/9 because he was "tired of the way he was living." Psychiatry was consulted for suicidal ideation.     Chart was reviewed prior to seeing pt at bedside. AxO x 2 (not able to state the date or day of the week). Pt was seen laying in bed appears mildly anxious, cooperative during interview. Pt seemed tired and uncomfortable, as he states he only slept 30 min last night and can't seem to get comfortable in the bed that he is sleeping in. Otherwise, pt is alert and was able to respond and carry out a conversation. Pt states that he has been on medications for anxiety and depression for a "long time." Denies feeling depressed, but feels anxious because he misses his grandchildren, he is uncomfortable in the hospital. He reports he has been taking his Valium 1-2 times per day over the last month due to anxiety. He uses Trazodone to sleep at night. He states he thinks about a sleeping pills just to be able to sleep and ease his distress, but he does not want to die. He states he would never kill himself because of his family. When asked what he wanted to live for, pt gets emotional and mentions his family and also his upcoming birthday. He requested the team to call his wife to inform her of his condition.  Pt denies any paranoia, AH/VH, SI/HI, violent tendencies. Pt denies feeling confused. He was unable to spell WORLD backward. He was unable to list the months of the year in reverse order. 74 y/o M, , living with wife, daughter, and grandchildren in a house in [place], past psychiatric history of anxiety/depression (sees outpt psychiatrist Dr. Robertson in [place]),no known substance or alcohol abuse, no known prior suicide attempts w/ PMH HTN, DM2, HLD,  diagnosed with COVID 11/2021 presents to the ED with nonexertional SOB, cough with white phlegm, substernal CP that radiates to L arm. On that same day, pt requested his wife to give him all of his sleeping pills on 12/9 because he was "tired of the way he was living." Psychiatry was consulted for suicidal ideation.     Chart was reviewed prior to seeing pt at bedside. AxO x 2 (not able to state the date or day of the week). Pt was seen laying in bed appears mildly anxious, cooperative during interview. Pt seemed tired and uncomfortable, as he states he only slept 30 min last night and can't seem to get comfortable in the bed that he is sleeping in. Otherwise, pt is alert and was able to respond and carry out a conversation. Pt states that he has been on medications for anxiety and depression for a "long time." Denies feeling depressed, but feels anxious because he misses his grandchildren, he is uncomfortable in the hospital. He reports he has been taking his Valium 1-2 times per day over the last month due to anxiety. He uses Trazodone to sleep at night. He states he thinks about a sleeping pills just to be able to sleep and ease his distress, but he does not want to die. He states he would never kill himself because of his family. When asked what he wanted to live for, pt gets emotional and mentions his family/wife/grandchildren and also his upcoming birthday. He requested the team to call his wife to inform her of his condition.  Pt denies any paranoia, AH/VH, SI/HI, violent tendencies. Pt denies feeling confused. He was unable to spell WORLD backward. He was unable to list the months of the year in reverse order.

## 2021-12-10 NOTE — BH CONSULTATION LIAISON ASSESSMENT NOTE - SUMMARY
76 y/o M, , living with wife, daughter, and grandchildren in a house in [place], past psychiatric history of anxiety/depression (sees outpt psychiatrist Dr. Robertson in [place]),no known substance or alcohol abuse, no known prior suicide attempts w/ PMH HTN, DM2, HLD,  diagnosed with COVID 11/2021 presents to the ED with nonexertional SOB, cough with white phlegm, substernal CP that radiates to L arm. On that same day, pt requested his wife to give him all of his sleeping pills on 12/9 because he was "tired of the way he was living." Psychiatry was consulted for suicidal ideation.    The patient presents to the hospital due to worsening dyspnea and anxiety in the context of recent COVID pneumonia, and was found to have nSTEMI. He reports worsening anxiety and sleeplessness over the last month, leading him to make statements about taking sleeping pills. He denies suicidal ideation or intention and clarifies that he doesn't want to die, he just wants some relief from his anxiety. He has no past history of suicide attempts and is in treatment with a psychiatrist and is adherent with medications. He cites his family as a reason to live and would never leave them on purpose. He reports worsening anxiety over the last month due to his illness and has been using his Valium 5mg 1-2x per day. He also endorses chronic insomnia for which he takes Trazodone at home. His chronic symptoms appear to have been exacerbated by this acute illness and the patient has been removed from his home environment which clearly is destabilizing for him, likely worsening his feelings of anxious distress. He also presents with limited attention suggesting some component of acute delirium.    Plan:  -No psychiatric indication for 1:1 CO, the patient denies any SIIP and is overall low acute risk for suicide  Standing:  -CONTINUE home Zoloft 100mg daily for depression/anxiety  -START Melatonin 3mg for insomnia  PRN:  -CONTINUE home Valium PO 5mg PRN Q6 hours with hold orders for sedation or respiratory depression  -CONTINUE Trazadone 50 mg PO PRN at bedtime for insomnia  -Psych CL will continue to f/u. Instruct pt to f/u with his outpt psychiatrist after discharge  74 y/o M, , living with wife, daughter, and grandchildren in a house in [place], past psychiatric history of anxiety/depression (sees outpt psychiatrist Dr. Robertson in [place]),no known substance or alcohol abuse, no known prior suicide attempts w/ PMH HTN, DM2, HLD,  diagnosed with COVID 11/2021 presents to the ED with nonexertional SOB, cough with white phlegm, substernal CP that radiates to L arm. On that same day, pt requested his wife to give him all of his sleeping pills on 12/9 because he was "tired of the way he was living." Psychiatry was consulted for suicidal ideation.    The patient presents to the hospital due to worsening dyspnea and anxiety in the context of recent COVID pneumonia, and was found to have nSTEMI. He reports worsening anxiety and sleeplessness over the last month, leading him to make statements about taking sleeping pills. He denies suicidal ideation or intention and clarifies that he doesn't want to die, he just wants some relief from his anxiety. He has no past history of suicide attempts and is in treatment with a psychiatrist and is adherent with medications. He cites his family as a reason to live and would never leave them on purpose. He reports worsening anxiety over the last month due to his illness and has been using his Valium 5mg 1-2x per day. He also endorses chronic insomnia for which he takes Trazodone at home. His chronic symptoms appear to have been exacerbated by this acute illness and the patient has been removed from his home environment which clearly is destabilizing for him, likely worsening his feelings of anxious distress. He also presents with limited attention suggesting some component of acute delirium.    Plan:  -No psychiatric indication for 1:1 CO, the patient denies any SIIP and is overall low acute risk for suicide  Standing:  -CONTINUE home Zoloft 100mg daily for depression/anxiety  -START Melatonin 3mg for insomnia  PRN:  -CONTINUE home Valium PO 5mg PRN BID  for anxiety with hold orders for sedation or respiratory depression, monitor pulse OX closely  -CONTINUE Trazodone 50 mg PO PRN at bedtime for insomnia  -Psych CL will continue to f/u. Instruct pt to f/u with his outpt psychiatrist after discharge

## 2021-12-10 NOTE — BH CONSULTATION LIAISON ASSESSMENT NOTE - CASE SUMMARY
Chart reviewed, pt. seen and evaluated with Dr. Pugh, I agree with above assessment and plan, pt. AAOX2, cooperative/engaging well, affect is anxious, attention is somewhat impaired, pt. reports feeling anxious/frustrated in the setting of acute medical issues. He denies SI and HI, no evidence of kait or psychosis. Patient cites his family and his own well being as his protective factors, he is looking forward to his upcoming birthday. Plan as above, case d/w primary team, will follow

## 2021-12-10 NOTE — CONSULT NOTE ADULT - ASSESSMENT
75M history of OA, HTN, DM2, Hyperlipidemia, Depression, Anxiety, admitted for SI with pills, chest pain with elevated Troponin started on a Heparin drip.   
75M poor historian, with hx of HTN, DM, obesity, COVID 19 (a month ago) and anxiety who  comes to the ED c/o of progressive shortness of breath, chest tightness, dry cough and fatigue for the past month. He denies leg swelling, no orthopnea no PND. Per records with suicidal thoughts, when asked, patient reports all he wants is to sleep because he feels tired but he denies self harming ideas.   In the ED patient EKG without ischemic changes, Hs trop 132 >>138, Pro-, CT angio with no evidence of PE. VBG  normal lactic acid.  SARS-COV-2 detected.   Rest of the current visit labs were reviewed   Currently chest pain free.

## 2021-12-10 NOTE — CONSULT NOTE ADULT - PROBLEM SELECTOR RECOMMENDATION 9
Patient found to have elevated high sensitivity troponin 132 -> 138 -> 141, , CKMB 9.8, ckmb index 6.7. EKG Afib @ 71 bpm, no ischemic changes. Currently chest pain free. NSTEMI v demand in the setting of infectious process.  Plan   - Continue heparin and aspirin  - Load Plavix 300mg  - Serial EKG, PRN chest pain  - labs include serial cardiac enzymes, risk factor profile to include TSH, HGBA1c, Lipid profile,  pBNP  - Echo to evaluate LV function and wall motion abnormality  - ischemic eval Patient found to have elevated high sensitivity troponin 132 -> 138 -> 141, , CKMB 9.8, ckmb index 6.7. EKG Afib @ 71 bpm, no ischemic changes. Currently chest pain free. NSTEMI v demand in the setting of infectious process.  Plan   - Continue heparin and aspirin  - Serial EKG, PRN chest pain  - labs include serial cardiac enzymes, risk factor profile to include TSH, HGBA1c, Lipid profile,  pBNP  - Echo to evaluate LV function and wall motion abnormality  - ischemic eval

## 2021-12-10 NOTE — BH CONSULTATION LIAISON ASSESSMENT NOTE - NSSUICPROTFACT_PSY_ALL_CORE
Responsibility to children, family, or others/Identifies reasons for living/Supportive social network of family or friends/Cultural, spiritual and/or moral attitudes against suicide/Positive therapeutic relationships

## 2021-12-10 NOTE — BH CONSULTATION LIAISON ASSESSMENT NOTE - NSBHCHARTREVIEWVS_PSY_A_CORE FT
Vital Signs Last 24 Hrs  T(C): 36.4 (10 Dec 2021 11:42), Max: 36.8 (09 Dec 2021 15:47)  T(F): 97.5 (10 Dec 2021 11:42), Max: 98.2 (09 Dec 2021 15:47)  HR: 70 (10 Dec 2021 11:42) (70 - 83)  BP: 149/73 (10 Dec 2021 11:42) (104/71 - 187/70)  BP(mean): --  RR: 18 (10 Dec 2021 11:42) (18 - 20)  SpO2: 98% (10 Dec 2021 11:42) (94% - 100%)

## 2021-12-10 NOTE — BH CONSULTATION LIAISON ASSESSMENT NOTE - NSCOMMENTSUICRISKFACT_PSY_ALL_CORE
Wife believes that pt is having an anxiety episode worsened by his dx of COVID. This provoked him to ask for all of his sleeping pills.

## 2021-12-10 NOTE — BH CONSULTATION LIAISON ASSESSMENT NOTE - NSBHCHARTREVIEWLAB_PSY_A_CORE FT
LABS:  12-10 @ 00:46 Creatine 146 U/L [30 - 200]  12-09 @ 20:59 Creatine 153 U/L [30 - 200]  cret                        12.7   5.09  )-----------( 215      ( 10 Dec 2021 03:26 )             41.5     12-10    140  |  102  |  23  ----------------------------<  77  3.8   |  24  |  0.86    Ca    9.7      10 Dec 2021 03:26  Phos  2.5     12-10  Mg     1.70     12-10    TPro  7.7  /  Alb  4.4  /  TBili  0.7  /  DBili  x   /  AST  25  /  ALT  15  /  AlkPhos  63  12-09    PT/INR - ( 09 Dec 2021 17:25 )   PT: 15.0 sec;   INR: 1.33 ratio         PTT - ( 10 Dec 2021 10:09 )  PTT:66.1 sec

## 2021-12-10 NOTE — BH CONSULTATION LIAISON ASSESSMENT NOTE - NSACTIVEVENT_PSY_ALL_CORE
Chronic pain or other acute medical condition
Left UE Active ROM was WNL (within normal limits)/Right UE Active ROM was WNL (within normal limits)

## 2021-12-10 NOTE — BH CONSULTATION LIAISON ASSESSMENT NOTE - RISK ASSESSMENT
Pt denies SI or previous suicide attempts and mentions that he has family to live for. Pt denies having access to weapons.  Risk factors; H/o anxiety/depression, acute medical issues, anxious  Protective factors; Pt denies SI or previous suicide attempts and mentions that he has family to live for. Pt denies having access to weapons. He is future oriented, treatment seeking, has oupt. provider, reports compliance with meds.    Pt. is not at acute risk of harm to self or others at this time and does not meet criteria for involuntary psych. admission.

## 2021-12-10 NOTE — BH CONSULTATION LIAISON ASSESSMENT NOTE - NSBHCONSULTFOLLOWAFTERCARE_PSY_A_CORE FT
Psych CL will continue to f/u or f/u with outpt psychiatrist  f/u with outpt psychiatrist Dr. Robertson in [place]

## 2021-12-10 NOTE — BH CONSULTATION LIAISON ASSESSMENT NOTE - MSE UNSTRUCTURED FT
Appearance: fair grooming and hygiene, elderly overweight man, has well groomed mustache, appears tired  Behavior: fair eye contact. Pt cooperative with exam  Motor: no abnormal movements, no psychomotor retardation  Speech: low volume, slightly latent speech, but normal rate and production  Mood: anxious  Affect: mildly anxious, somewhat labile, cries easily,   Thought process: linear, logical  Thought content: misses family, upset about being in the hospital. denies any SI/HI. Denies violent thoughts or paranoia. no delusional content was elicited  Cognition: AxO x 2. Limited attention (unable to list months of the year backwards or spell WORLD backwards)  Perceptions: denies any AH/VH  Insight: poor  Judgement: fair  Impulse control: fair at time of interview

## 2021-12-10 NOTE — BH CONSULTATION LIAISON ASSESSMENT NOTE - NSBHREFERDETAILS_PSY_A_CORE_FT
Interval History:     Past Medical History:   Diagnosis Date    Anemia     Basal cell carcinoma 7/2014    left medial canthus    Basal cell carcinoma 3/9/2015    right forehead    Basal cell carcinoma 09/08/2016    left neck (scoop shave)    Basal cell carcinoma 12/09/2016    left preauricular    BPH (benign prostatic hypertrophy) 8/17/2012    CAD (coronary artery disease) 8/17/2012    Cervical spine fracture 11/20/2012    CHF (congestive heart failure) 8/17/2012    Depression 8/17/2012    GERD (gastroesophageal reflux disease) 3/27/2014    Heart attack     History of atrial fibrillation 8/17/2012    Hyperlipidemia     Hypertension     Kidney stones 8/17/2012    Memory loss     Myocardial infarction     Osteoarthritis of lumbar spine 8/17/2012    Shoulder pain 8/17/2012    Stroke 8/17/2012       Past Surgical History:   Procedure Laterality Date    basal cell carcinoma left ear      CARDIAC SURGERY      CATARACT EXTRACTION W/  INTRAOCULAR LENS IMPLANT Bilateral     CATARACT EXTRACTION, BILATERAL      CORONARY ARTERY BYPASS GRAFT  1990    x1    ECHOCARDIOGRAM-TRANSESOPHAGEAL N/A 6/2/2015    Performed by [facility] at [facility]    TONSILLECTOMY         Review of patient's allergies indicates:   Allergen Reactions    Prednisone Hallucinations       Medications:  Continuous Infusions:    Scheduled Meds:   apixaban  2.5 mg Oral BID    aspirin  81 mg Oral Daily    buPROPion  300 mg Oral QAM    citalopram  10 mg Oral Daily    omega-3 acid ethyl esters  2 g Oral Daily    paroxetine  10 mg Oral QAM    piperacillin-tazobactam (ZOSYN) IVPB  4.5 g Intravenous Q12H    sodium chloride 0.9%  3 mL Intravenous Q8H    tamsulosin  0.4 mg Oral Daily     PRN Meds:albuterol, nitroGLYCERIN    Family History     Problem Relation (Age of Onset)    Cancer Father    Heart disease Mother    Heart failure Mother    No Known Problems Sister, Brother, Maternal Aunt, Maternal Uncle, Paternal Aunt, Paternal  Uncle, Maternal Grandmother, Maternal Grandfather, Paternal Grandmother, Paternal Grandfather        Tobacco Use    Smoking status: Never Smoker    Smokeless tobacco: Never Used   Substance and Sexual Activity    Alcohol use: No     Comment: social drinker    Drug use: No    Sexual activity: Not Currently       Review of Systems   Respiratory: Positive for cough and shortness of breath.    Cardiovascular: Positive for leg swelling.   Skin: Positive for pallor.   Neurological: Positive for weakness.   Psychiatric/Behavioral: Positive for confusion.     Objective:     Vital Signs (Most Recent):  Temp: 97.4 °F (36.3 °C) (01/28/19 0352)  Pulse: (!) 117 (01/28/19 1200)  Resp: 16 (01/28/19 0842)  BP: (!) 97/54 (01/28/19 1143)  SpO2: 96 % (01/28/19 1143) Vital Signs (24h Range):  Temp:  [96.6 °F (35.9 °C)-97.7 °F (36.5 °C)] 97.4 °F (36.3 °C)  Pulse:  [] 117  Resp:  [16] 16  SpO2:  [94 %-100 %] 96 %  BP: ()/(50-69) 97/54     Weight: 80.6 kg (177 lb 11.1 oz)  Body mass index is 27.02 kg/m².    Review of Symptoms  Symptom Assessment (ESAS 0-10 scale)   ESAS 0 1 2 3 4 5 6 7 8 9 10   Pain              Dyspnea              Anxiety              Nausea              Depression               Anorexia              Fatigue              Insomnia              Restlessness               Agitation              CAM / Delirium __ --  ___+   Constipation     __ --  ___+   Diarrhea           __ --  ___+  Bowel Management Plan (BMP): No    Comments: appears fatigued and has some disorientation.  States no pain or discomfort.  Non labored breathing     Pain Assessment:   OME in 24 hours: 0    Performance Status: 30    ECOG Performance Status Grade: 3 - Confined to bed or chair 50% of waking hours    Physical Exam   Constitutional: No distress.   Appears chronically ill    Cardiovascular:   Murmur heard.  Irregular rate, irregular rhythm    Pulmonary/Chest:   Course wet breath sounds, cough    Abdominal: Soft. Bowel sounds are  normal.   Neurological: He is alert.   Oriented to person and place    Skin: Skin is warm and dry. There is pallor.   Psychiatric: He has a normal mood and affect. His behavior is normal.       Significant Labs: All pertinent labs within the past 24 hours have been reviewed.  CBC:   Recent Labs   Lab 01/28/19  0508   WBC 6.31   HGB 10.0*   HCT 31.1*   MCV 92   PLT 64*     BMP:  Recent Labs   Lab 01/28/19  0508   *  113*     140   K 3.1*  3.1*     103   CO2 19*  19*   BUN 86*  86*   CREATININE 3.4*  3.4*   CALCIUM 8.5*  8.5*   MG 2.3     LFT:  Lab Results   Component Value Date     (H) 01/28/2019    ALKPHOS 128 01/28/2019    BILITOT 2.6 (H) 01/28/2019     Albumin:   Albumin   Date Value Ref Range Status   01/28/2019 2.1 (L) 3.5 - 5.2 g/dL Final     Protein:   Total Protein   Date Value Ref Range Status   01/28/2019 4.9 (L) 6.0 - 8.4 g/dL Final     Lactic acid:   Lab Results   Component Value Date    LACTATE 4.3 (HH) 01/26/2019    LACTATE 6.3 (HH) 01/26/2019       Significant Imaging: I have reviewed all pertinent imaging results/findings within the past 24 hours.    Advanced Directives::  Living Will: No  LaPOST: No  Do Not Resuscitate Status: Yes  Medical Power of : No    Decision-Making Capacity: Patient answered questions    Living Arrangements: Lives with spouse    Psychosocial/Cultural: , three children, 4 grandchildren, retired  for city [employer].  served in [employer]         Spiritual:     F- Bianca and Belief: Judaism     I - Importance:   .  C - Community:     A - Address in Care:  services offere       SI  SI and anxiety

## 2021-12-10 NOTE — BH CONSULTATION LIAISON ASSESSMENT NOTE - CURRENT MEDICATION
MEDICATIONS  (STANDING):  aspirin enteric coated 81 milliGRAM(s) Oral daily  atorvastatin 80 milliGRAM(s) Oral at bedtime  dextrose 40% Gel 15 Gram(s) Oral once  dextrose 5%. 1000 milliLiter(s) (50 mL/Hr) IV Continuous <Continuous>  dextrose 5%. 1000 milliLiter(s) (100 mL/Hr) IV Continuous <Continuous>  dextrose 50% Injectable 25 Gram(s) IV Push once  dextrose 50% Injectable 12.5 Gram(s) IV Push once  dextrose 50% Injectable 25 Gram(s) IV Push once  glucagon  Injectable 1 milliGRAM(s) IntraMuscular once  heparin  Infusion.  Unit(s)/Hr (10 mL/Hr) IV Continuous <Continuous>  influenza  Vaccine (HIGH DOSE) 0.7 milliLiter(s) IntraMuscular once  insulin lispro (ADMELOG) corrective regimen sliding scale   SubCutaneous three times a day before meals  insulin lispro (ADMELOG) corrective regimen sliding scale   SubCutaneous at bedtime  losartan 100 milliGRAM(s) Oral daily  metoprolol succinate ER 25 milliGRAM(s) Oral daily  polyethylene glycol 3350 17 Gram(s) Oral daily  senna 2 Tablet(s) Oral at bedtime  sertraline 100 milliGRAM(s) Oral daily  sodium chloride 0.9% lock flush 3 milliLiter(s) IV Push every 8 hours  traZODone 50 milliGRAM(s) Oral at bedtime    MEDICATIONS  (PRN):  acetaminophen     Tablet .. 650 milliGRAM(s) Oral every 6 hours PRN Temp greater or equal to 38C (100.4F), Mild Pain (1 - 3)  diazepam    Tablet 5 milliGRAM(s) Oral two times a day PRN Anxiety  heparin   Injectable 6000 Unit(s) IV Push every 6 hours PRN For aPTT less than 40  levalbuterol Inhalation 0.63 milliGRAM(s) Inhalation every 6 hours PRN Shortness of breath / wheezing

## 2021-12-10 NOTE — PATIENT PROFILE ADULT - FALL HARM RISK - HARM RISK INTERVENTIONS

## 2021-12-11 DIAGNOSIS — Z29.9 ENCOUNTER FOR PROPHYLACTIC MEASURES, UNSPECIFIED: ICD-10-CM

## 2021-12-11 DIAGNOSIS — U07.1 COVID-19: ICD-10-CM

## 2021-12-11 LAB
ANION GAP SERPL CALC-SCNC: 15 MMOL/L — HIGH (ref 7–14)
APTT BLD: 53.4 SEC — HIGH (ref 27–36.3)
APTT BLD: 64 SEC — HIGH (ref 27–36.3)
APTT BLD: 75 SEC — HIGH (ref 27–36.3)
BUN SERPL-MCNC: 25 MG/DL — HIGH (ref 7–23)
CALCIUM SERPL-MCNC: 9.7 MG/DL — SIGNIFICANT CHANGE UP (ref 8.4–10.5)
CHLORIDE SERPL-SCNC: 103 MMOL/L — SIGNIFICANT CHANGE UP (ref 98–107)
CK MB CFR SERPL CALC: 9.6 NG/ML — HIGH
CO2 SERPL-SCNC: 23 MMOL/L — SIGNIFICANT CHANGE UP (ref 22–31)
CREAT SERPL-MCNC: 0.94 MG/DL — SIGNIFICANT CHANGE UP (ref 0.5–1.3)
GLUCOSE BLDC GLUCOMTR-MCNC: 103 MG/DL — HIGH (ref 70–99)
GLUCOSE BLDC GLUCOMTR-MCNC: 103 MG/DL — HIGH (ref 70–99)
GLUCOSE BLDC GLUCOMTR-MCNC: 105 MG/DL — HIGH (ref 70–99)
GLUCOSE BLDC GLUCOMTR-MCNC: 92 MG/DL — SIGNIFICANT CHANGE UP (ref 70–99)
GLUCOSE SERPL-MCNC: 85 MG/DL — SIGNIFICANT CHANGE UP (ref 70–99)
HCT VFR BLD CALC: 40.3 % — SIGNIFICANT CHANGE UP (ref 39–50)
HGB BLD-MCNC: 12.1 G/DL — LOW (ref 13–17)
MAGNESIUM SERPL-MCNC: 1.6 MG/DL — SIGNIFICANT CHANGE UP (ref 1.6–2.6)
MCHC RBC-ENTMCNC: 25.7 PG — LOW (ref 27–34)
MCHC RBC-ENTMCNC: 30 GM/DL — LOW (ref 32–36)
MCV RBC AUTO: 85.7 FL — SIGNIFICANT CHANGE UP (ref 80–100)
NRBC # BLD: 0 /100 WBCS — SIGNIFICANT CHANGE UP
NRBC # FLD: 0 K/UL — SIGNIFICANT CHANGE UP
PHOSPHATE SERPL-MCNC: 2.6 MG/DL — SIGNIFICANT CHANGE UP (ref 2.5–4.5)
PLATELET # BLD AUTO: 196 K/UL — SIGNIFICANT CHANGE UP (ref 150–400)
POTASSIUM SERPL-MCNC: 3.7 MMOL/L — SIGNIFICANT CHANGE UP (ref 3.5–5.3)
POTASSIUM SERPL-SCNC: 3.7 MMOL/L — SIGNIFICANT CHANGE UP (ref 3.5–5.3)
RBC # BLD: 4.7 M/UL — SIGNIFICANT CHANGE UP (ref 4.2–5.8)
RBC # FLD: 14.5 % — SIGNIFICANT CHANGE UP (ref 10.3–14.5)
SODIUM SERPL-SCNC: 141 MMOL/L — SIGNIFICANT CHANGE UP (ref 135–145)
TROPONIN T, HIGH SENSITIVITY RESULT: 118 NG/L — CRITICAL HIGH
WBC # BLD: 5.18 K/UL — SIGNIFICANT CHANGE UP (ref 3.8–10.5)
WBC # FLD AUTO: 5.18 K/UL — SIGNIFICANT CHANGE UP (ref 3.8–10.5)

## 2021-12-11 PROCEDURE — 93306 TTE W/DOPPLER COMPLETE: CPT | Mod: 26

## 2021-12-11 PROCEDURE — 99233 SBSQ HOSP IP/OBS HIGH 50: CPT

## 2021-12-11 RX ORDER — SODIUM CHLORIDE 0.65 %
1 AEROSOL, SPRAY (ML) NASAL
Refills: 0 | Status: DISCONTINUED | OUTPATIENT
Start: 2021-12-11 | End: 2021-12-14

## 2021-12-11 RX ORDER — ENOXAPARIN SODIUM 100 MG/ML
40 INJECTION SUBCUTANEOUS DAILY
Refills: 0 | Status: DISCONTINUED | OUTPATIENT
Start: 2021-12-12 | End: 2021-12-14

## 2021-12-11 RX ORDER — DIAZEPAM 5 MG
5 TABLET ORAL
Refills: 0 | Status: DISCONTINUED | OUTPATIENT
Start: 2021-12-11 | End: 2021-12-13

## 2021-12-11 RX ORDER — DIAZEPAM 5 MG
2.5 TABLET ORAL ONCE
Refills: 0 | Status: DISCONTINUED | OUTPATIENT
Start: 2021-12-11 | End: 2021-12-12

## 2021-12-11 RX ORDER — POLYETHYLENE GLYCOL 3350 17 G/17G
17 POWDER, FOR SOLUTION ORAL
Refills: 0 | Status: DISCONTINUED | OUTPATIENT
Start: 2021-12-11 | End: 2021-12-14

## 2021-12-11 RX ADMIN — HEPARIN SODIUM 1100 UNIT(S)/HR: 5000 INJECTION INTRAVENOUS; SUBCUTANEOUS at 18:32

## 2021-12-11 RX ADMIN — HEPARIN SODIUM 1100 UNIT(S)/HR: 5000 INJECTION INTRAVENOUS; SUBCUTANEOUS at 10:57

## 2021-12-11 RX ADMIN — Medication 3 MILLIGRAM(S): at 21:22

## 2021-12-11 RX ADMIN — Medication 1 SPRAY(S): at 17:08

## 2021-12-11 RX ADMIN — Medication 100 MILLIGRAM(S): at 06:03

## 2021-12-11 RX ADMIN — Medication 25 MILLIGRAM(S): at 06:03

## 2021-12-11 RX ADMIN — Medication 1 SPRAY(S): at 06:03

## 2021-12-11 RX ADMIN — SODIUM CHLORIDE 3 MILLILITER(S): 9 INJECTION INTRAMUSCULAR; INTRAVENOUS; SUBCUTANEOUS at 06:19

## 2021-12-11 RX ADMIN — HEPARIN SODIUM 1200 UNIT(S)/HR: 5000 INJECTION INTRAVENOUS; SUBCUTANEOUS at 02:51

## 2021-12-11 RX ADMIN — Medication 100 MILLIGRAM(S): at 12:42

## 2021-12-11 RX ADMIN — TAMSULOSIN HYDROCHLORIDE 0.4 MILLIGRAM(S): 0.4 CAPSULE ORAL at 21:30

## 2021-12-11 RX ADMIN — LOSARTAN POTASSIUM 100 MILLIGRAM(S): 100 TABLET, FILM COATED ORAL at 06:03

## 2021-12-11 RX ADMIN — ATORVASTATIN CALCIUM 80 MILLIGRAM(S): 80 TABLET, FILM COATED ORAL at 21:22

## 2021-12-11 RX ADMIN — Medication 81 MILLIGRAM(S): at 12:42

## 2021-12-11 RX ADMIN — SODIUM CHLORIDE 3 MILLILITER(S): 9 INJECTION INTRAMUSCULAR; INTRAVENOUS; SUBCUTANEOUS at 21:57

## 2021-12-11 RX ADMIN — Medication 100 MILLIGRAM(S): at 21:22

## 2021-12-11 RX ADMIN — Medication 5 MILLIGRAM(S): at 17:08

## 2021-12-11 RX ADMIN — SERTRALINE 100 MILLIGRAM(S): 25 TABLET, FILM COATED ORAL at 12:42

## 2021-12-11 RX ADMIN — Medication 50 MILLIGRAM(S): at 21:22

## 2021-12-11 RX ADMIN — POLYETHYLENE GLYCOL 3350 17 GRAM(S): 17 POWDER, FOR SOLUTION ORAL at 12:42

## 2021-12-11 RX ADMIN — SODIUM CHLORIDE 3 MILLILITER(S): 9 INJECTION INTRAMUSCULAR; INTRAVENOUS; SUBCUTANEOUS at 14:23

## 2021-12-11 NOTE — PHYSICAL THERAPY INITIAL EVALUATION ADULT - ADDITIONAL COMMENTS
Pt reports that he lives on the 2nd floor with his wife with a flight of stairs to negotiate; (+)1 handrail; no steps to enter. Pt's daughter and grandchildren live on the 1st floor. Prior to hospital admission pt was completely independent and used either a single axis cane or rolling walker with ambulation. Pt believes his last fall was about a week ago.    Pt left comfortable in bed, NAD, all lines intact, all precautions maintained, with call bell in reach, bed alarm on, SpO2 98% on room air, and RN aware.

## 2021-12-11 NOTE — PHYSICAL THERAPY INITIAL EVALUATION ADULT - PRECAUTIONS/LIMITATIONS, REHAB EVAL
AIR[place]NE/CONTACT- +COVID-19/cardiac precautions/fall precautions/isolation precautions/seizure precautions

## 2021-12-11 NOTE — PHYSICAL THERAPY INITIAL EVALUATION ADULT - DIAGNOSIS, PT EVAL
Pt admitted for +NSTEMI, +COVID-19, and Suicide Ideation; pt presents with decreased strength, decreased balance, and decreased aerobic capacity/endurance.

## 2021-12-11 NOTE — PHYSICAL THERAPY INITIAL EVALUATION ADULT - GENERAL OBSERVATIONS, REHAB EVAL
Pt encountered in semisupine position, no distress, AxOx2, with +IV, +cardiac monitor, and pulse oximeter.

## 2021-12-11 NOTE — PHYSICAL THERAPY INITIAL EVALUATION ADULT - PATIENT PROFILE REVIEW, REHAB EVAL
ACTIVITY: BEDREST/ Ambulate with Assistance; spoke with RN Erin Dent prior to PT evaluation--> Pt OK for PT consult/OOB activity./yes

## 2021-12-11 NOTE — CHART NOTE - NSCHARTNOTEFT_GEN_A_CORE
Pt is c/o chest pain as per bedside RN.  As per my bedside assessment pt appears in no acute distress.  Pt c/o chest pain rated 7/10, non-radiating.  ECG reviewed. Trop & CKMB pending results.   Valium 5mg PO ordered to be given STAT as pt expresses anxiety.  Will continue to monitor.

## 2021-12-11 NOTE — PHYSICAL THERAPY INITIAL EVALUATION ADULT - PERTINENT HX OF CURRENT PROBLEM, REHAB EVAL
75M history of OA, HTN, DM2, Hyperlipidemia, Depression, Anxiety, admitted for Suicide Ideation with pills, chest pain with elevated Troponin started on a Heparin drip.

## 2021-12-12 LAB
ANION GAP SERPL CALC-SCNC: 17 MMOL/L — HIGH (ref 7–14)
APPEARANCE UR: CLEAR — SIGNIFICANT CHANGE UP
APTT BLD: 32 SEC — SIGNIFICANT CHANGE UP (ref 27–36.3)
BACTERIA # UR AUTO: NEGATIVE — SIGNIFICANT CHANGE UP
BILIRUB UR-MCNC: ABNORMAL
BUN SERPL-MCNC: 25 MG/DL — HIGH (ref 7–23)
CALCIUM SERPL-MCNC: 9.9 MG/DL — SIGNIFICANT CHANGE UP (ref 8.4–10.5)
CHLORIDE SERPL-SCNC: 103 MMOL/L — SIGNIFICANT CHANGE UP (ref 98–107)
CO2 SERPL-SCNC: 21 MMOL/L — LOW (ref 22–31)
COLOR SPEC: YELLOW — SIGNIFICANT CHANGE UP
CREAT SERPL-MCNC: 0.91 MG/DL — SIGNIFICANT CHANGE UP (ref 0.5–1.3)
CRP SERPL-MCNC: <4 MG/L — SIGNIFICANT CHANGE UP
D DIMER BLD IA.RAPID-MCNC: 197 NG/ML DDU — SIGNIFICANT CHANGE UP
DIFF PNL FLD: NEGATIVE — SIGNIFICANT CHANGE UP
EPI CELLS # UR: 1 /HPF — SIGNIFICANT CHANGE UP (ref 0–5)
FERRITIN SERPL-MCNC: 86 NG/ML — SIGNIFICANT CHANGE UP (ref 30–400)
GLUCOSE BLDC GLUCOMTR-MCNC: 89 MG/DL — SIGNIFICANT CHANGE UP (ref 70–99)
GLUCOSE BLDC GLUCOMTR-MCNC: 91 MG/DL — SIGNIFICANT CHANGE UP (ref 70–99)
GLUCOSE BLDC GLUCOMTR-MCNC: 92 MG/DL — SIGNIFICANT CHANGE UP (ref 70–99)
GLUCOSE BLDC GLUCOMTR-MCNC: 95 MG/DL — SIGNIFICANT CHANGE UP (ref 70–99)
GLUCOSE SERPL-MCNC: 92 MG/DL — SIGNIFICANT CHANGE UP (ref 70–99)
GLUCOSE UR QL: NEGATIVE — SIGNIFICANT CHANGE UP
HCT VFR BLD CALC: 41.7 % — SIGNIFICANT CHANGE UP (ref 39–50)
HGB BLD-MCNC: 13 G/DL — SIGNIFICANT CHANGE UP (ref 13–17)
HYALINE CASTS # UR AUTO: 2 /LPF — SIGNIFICANT CHANGE UP (ref 0–7)
KETONES UR-MCNC: ABNORMAL
LDH SERPL L TO P-CCNC: 198 U/L — SIGNIFICANT CHANGE UP (ref 135–225)
LEUKOCYTE ESTERASE UR-ACNC: NEGATIVE — SIGNIFICANT CHANGE UP
MAGNESIUM SERPL-MCNC: 1.7 MG/DL — SIGNIFICANT CHANGE UP (ref 1.6–2.6)
MCHC RBC-ENTMCNC: 26.2 PG — LOW (ref 27–34)
MCHC RBC-ENTMCNC: 31.2 GM/DL — LOW (ref 32–36)
MCV RBC AUTO: 83.9 FL — SIGNIFICANT CHANGE UP (ref 80–100)
NITRITE UR-MCNC: NEGATIVE — SIGNIFICANT CHANGE UP
NRBC # BLD: 0 /100 WBCS — SIGNIFICANT CHANGE UP
NRBC # FLD: 0 K/UL — SIGNIFICANT CHANGE UP
PH UR: 6 — SIGNIFICANT CHANGE UP (ref 5–8)
PHOSPHATE SERPL-MCNC: 3.3 MG/DL — SIGNIFICANT CHANGE UP (ref 2.5–4.5)
PLATELET # BLD AUTO: 199 K/UL — SIGNIFICANT CHANGE UP (ref 150–400)
POTASSIUM SERPL-MCNC: 3.8 MMOL/L — SIGNIFICANT CHANGE UP (ref 3.5–5.3)
POTASSIUM SERPL-SCNC: 3.8 MMOL/L — SIGNIFICANT CHANGE UP (ref 3.5–5.3)
PROCALCITONIN SERPL-MCNC: 0.05 NG/ML — SIGNIFICANT CHANGE UP (ref 0.02–0.1)
PROT UR-MCNC: ABNORMAL
RBC # BLD: 4.97 M/UL — SIGNIFICANT CHANGE UP (ref 4.2–5.8)
RBC # FLD: 14.6 % — HIGH (ref 10.3–14.5)
RBC CASTS # UR COMP ASSIST: 3 /HPF — SIGNIFICANT CHANGE UP (ref 0–4)
SODIUM SERPL-SCNC: 141 MMOL/L — SIGNIFICANT CHANGE UP (ref 135–145)
SP GR SPEC: 1.03 — SIGNIFICANT CHANGE UP (ref 1–1.05)
UROBILINOGEN FLD QL: ABNORMAL
WBC # BLD: 5.65 K/UL — SIGNIFICANT CHANGE UP (ref 3.8–10.5)
WBC # FLD AUTO: 5.65 K/UL — SIGNIFICANT CHANGE UP (ref 3.8–10.5)
WBC UR QL: 1 /HPF — SIGNIFICANT CHANGE UP (ref 0–5)

## 2021-12-12 PROCEDURE — 99233 SBSQ HOSP IP/OBS HIGH 50: CPT

## 2021-12-12 RX ADMIN — POLYETHYLENE GLYCOL 3350 17 GRAM(S): 17 POWDER, FOR SOLUTION ORAL at 17:14

## 2021-12-12 RX ADMIN — Medication 3 MILLIGRAM(S): at 22:01

## 2021-12-12 RX ADMIN — SERTRALINE 100 MILLIGRAM(S): 25 TABLET, FILM COATED ORAL at 10:59

## 2021-12-12 RX ADMIN — Medication 650 MILLIGRAM(S): at 12:46

## 2021-12-12 RX ADMIN — SODIUM CHLORIDE 3 MILLILITER(S): 9 INJECTION INTRAMUSCULAR; INTRAVENOUS; SUBCUTANEOUS at 13:01

## 2021-12-12 RX ADMIN — Medication 100 MILLIGRAM(S): at 13:17

## 2021-12-12 RX ADMIN — Medication 100 MILLIGRAM(S): at 22:01

## 2021-12-12 RX ADMIN — Medication 2.5 MILLIGRAM(S): at 00:48

## 2021-12-12 RX ADMIN — Medication 25 MILLIGRAM(S): at 05:49

## 2021-12-12 RX ADMIN — Medication 50 MILLIGRAM(S): at 22:01

## 2021-12-12 RX ADMIN — SENNA PLUS 2 TABLET(S): 8.6 TABLET ORAL at 22:01

## 2021-12-12 RX ADMIN — SODIUM CHLORIDE 3 MILLILITER(S): 9 INJECTION INTRAMUSCULAR; INTRAVENOUS; SUBCUTANEOUS at 21:56

## 2021-12-12 RX ADMIN — LOSARTAN POTASSIUM 100 MILLIGRAM(S): 100 TABLET, FILM COATED ORAL at 05:49

## 2021-12-12 RX ADMIN — Medication 5 MILLIGRAM(S): at 22:02

## 2021-12-12 RX ADMIN — ENOXAPARIN SODIUM 40 MILLIGRAM(S): 100 INJECTION SUBCUTANEOUS at 10:58

## 2021-12-12 RX ADMIN — Medication 650 MILLIGRAM(S): at 11:46

## 2021-12-12 RX ADMIN — Medication 1 SPRAY(S): at 05:49

## 2021-12-12 RX ADMIN — Medication 5 MILLIGRAM(S): at 17:14

## 2021-12-12 RX ADMIN — Medication 10 MILLIGRAM(S): at 13:17

## 2021-12-12 RX ADMIN — Medication 81 MILLIGRAM(S): at 10:59

## 2021-12-12 RX ADMIN — Medication 1 SPRAY(S): at 17:14

## 2021-12-12 RX ADMIN — ATORVASTATIN CALCIUM 80 MILLIGRAM(S): 80 TABLET, FILM COATED ORAL at 22:01

## 2021-12-12 RX ADMIN — Medication 5 MILLIGRAM(S): at 05:49

## 2021-12-12 RX ADMIN — SODIUM CHLORIDE 3 MILLILITER(S): 9 INJECTION INTRAMUSCULAR; INTRAVENOUS; SUBCUTANEOUS at 06:01

## 2021-12-12 RX ADMIN — Medication 100 MILLIGRAM(S): at 05:49

## 2021-12-12 RX ADMIN — TAMSULOSIN HYDROCHLORIDE 0.4 MILLIGRAM(S): 0.4 CAPSULE ORAL at 22:01

## 2021-12-13 ENCOUNTER — TRANSCRIPTION ENCOUNTER (OUTPATIENT)
Age: 76
End: 2021-12-13

## 2021-12-13 PROBLEM — G47.00 INSOMNIA, UNSPECIFIED: Chronic | Status: ACTIVE | Noted: 2021-12-10

## 2021-12-13 PROBLEM — E11.9 TYPE 2 DIABETES MELLITUS WITHOUT COMPLICATIONS: Chronic | Status: ACTIVE | Noted: 2021-12-10

## 2021-12-13 PROBLEM — U07.1 COVID-19: Chronic | Status: ACTIVE | Noted: 2021-12-10

## 2021-12-13 PROBLEM — E78.5 HYPERLIPIDEMIA, UNSPECIFIED: Chronic | Status: ACTIVE | Noted: 2021-12-10

## 2021-12-13 PROBLEM — I10 ESSENTIAL (PRIMARY) HYPERTENSION: Chronic | Status: ACTIVE | Noted: 2021-12-10

## 2021-12-13 PROBLEM — F41.9 ANXIETY DISORDER, UNSPECIFIED: Chronic | Status: ACTIVE | Noted: 2021-12-10

## 2021-12-13 PROBLEM — F32.89 OTHER SPECIFIED DEPRESSIVE EPISODES: Chronic | Status: ACTIVE | Noted: 2021-12-10

## 2021-12-13 LAB
ANION GAP SERPL CALC-SCNC: 14 MMOL/L — SIGNIFICANT CHANGE UP (ref 7–14)
BUN SERPL-MCNC: 22 MG/DL — SIGNIFICANT CHANGE UP (ref 7–23)
CALCIUM SERPL-MCNC: 10 MG/DL — SIGNIFICANT CHANGE UP (ref 8.4–10.5)
CHLORIDE SERPL-SCNC: 101 MMOL/L — SIGNIFICANT CHANGE UP (ref 98–107)
CO2 SERPL-SCNC: 25 MMOL/L — SIGNIFICANT CHANGE UP (ref 22–31)
CREAT SERPL-MCNC: 0.83 MG/DL — SIGNIFICANT CHANGE UP (ref 0.5–1.3)
GLUCOSE BLDC GLUCOMTR-MCNC: 107 MG/DL — HIGH (ref 70–99)
GLUCOSE BLDC GLUCOMTR-MCNC: 98 MG/DL — SIGNIFICANT CHANGE UP (ref 70–99)
GLUCOSE SERPL-MCNC: 82 MG/DL — SIGNIFICANT CHANGE UP (ref 70–99)
HCT VFR BLD CALC: 41.2 % — SIGNIFICANT CHANGE UP (ref 39–50)
HGB BLD-MCNC: 12.5 G/DL — LOW (ref 13–17)
MAGNESIUM SERPL-MCNC: 1.8 MG/DL — SIGNIFICANT CHANGE UP (ref 1.6–2.6)
MCHC RBC-ENTMCNC: 25.6 PG — LOW (ref 27–34)
MCHC RBC-ENTMCNC: 30.3 GM/DL — LOW (ref 32–36)
MCV RBC AUTO: 84.3 FL — SIGNIFICANT CHANGE UP (ref 80–100)
NRBC # BLD: 0 /100 WBCS — SIGNIFICANT CHANGE UP
NRBC # FLD: 0 K/UL — SIGNIFICANT CHANGE UP
PHOSPHATE SERPL-MCNC: 3.2 MG/DL — SIGNIFICANT CHANGE UP (ref 2.5–4.5)
PLATELET # BLD AUTO: 191 K/UL — SIGNIFICANT CHANGE UP (ref 150–400)
POTASSIUM SERPL-MCNC: 3.5 MMOL/L — SIGNIFICANT CHANGE UP (ref 3.5–5.3)
POTASSIUM SERPL-SCNC: 3.5 MMOL/L — SIGNIFICANT CHANGE UP (ref 3.5–5.3)
RBC # BLD: 4.89 M/UL — SIGNIFICANT CHANGE UP (ref 4.2–5.8)
RBC # FLD: 14.5 % — SIGNIFICANT CHANGE UP (ref 10.3–14.5)
SODIUM SERPL-SCNC: 140 MMOL/L — SIGNIFICANT CHANGE UP (ref 135–145)
WBC # BLD: 5.95 K/UL — SIGNIFICANT CHANGE UP (ref 3.8–10.5)
WBC # FLD AUTO: 5.95 K/UL — SIGNIFICANT CHANGE UP (ref 3.8–10.5)

## 2021-12-13 PROCEDURE — 99233 SBSQ HOSP IP/OBS HIGH 50: CPT

## 2021-12-13 RX ORDER — HYDROCODONE BITARTRATE AND ACETAMINOPHEN 7.5; 325 MG/15ML; MG/15ML
0 SOLUTION ORAL
Qty: 0 | Refills: 0 | DISCHARGE

## 2021-12-13 RX ORDER — METOPROLOL TARTRATE 50 MG
1 TABLET ORAL
Qty: 30 | Refills: 0
Start: 2021-12-13 | End: 2022-01-11

## 2021-12-13 RX ORDER — SODIUM CHLORIDE 0.65 %
1 AEROSOL, SPRAY (ML) NASAL
Qty: 0 | Refills: 0 | DISCHARGE
Start: 2021-12-13

## 2021-12-13 RX ORDER — SENNA PLUS 8.6 MG/1
2 TABLET ORAL
Qty: 0 | Refills: 0 | DISCHARGE
Start: 2021-12-13

## 2021-12-13 RX ORDER — RIVAROXABAN 15 MG-20MG
1 KIT ORAL
Qty: 30 | Refills: 0
Start: 2021-12-13 | End: 2022-01-11

## 2021-12-13 RX ORDER — TRAZODONE HCL 50 MG
1 TABLET ORAL
Qty: 0 | Refills: 0 | DISCHARGE
Start: 2021-12-13

## 2021-12-13 RX ORDER — LOSARTAN POTASSIUM 100 MG/1
1 TABLET, FILM COATED ORAL
Qty: 0 | Refills: 0 | DISCHARGE
Start: 2021-12-13

## 2021-12-13 RX ORDER — MELOXICAM 15 MG/1
0 TABLET ORAL
Qty: 0 | Refills: 0 | DISCHARGE

## 2021-12-13 RX ORDER — SERTRALINE 25 MG/1
0 TABLET, FILM COATED ORAL
Qty: 0 | Refills: 0 | DISCHARGE

## 2021-12-13 RX ORDER — TRAZODONE HCL 50 MG
50 TABLET ORAL AT BEDTIME
Refills: 0 | Status: DISCONTINUED | OUTPATIENT
Start: 2021-12-13 | End: 2021-12-14

## 2021-12-13 RX ORDER — SERTRALINE 25 MG/1
1 TABLET, FILM COATED ORAL
Qty: 0 | Refills: 0 | DISCHARGE
Start: 2021-12-13

## 2021-12-13 RX ORDER — LOSARTAN POTASSIUM 100 MG/1
0 TABLET, FILM COATED ORAL
Qty: 0 | Refills: 0 | DISCHARGE

## 2021-12-13 RX ORDER — DIAZEPAM 5 MG
5 TABLET ORAL
Refills: 0 | Status: DISCONTINUED | OUTPATIENT
Start: 2021-12-13 | End: 2021-12-14

## 2021-12-13 RX ORDER — DIAZEPAM 5 MG
1 TABLET ORAL
Qty: 0 | Refills: 0 | DISCHARGE

## 2021-12-13 RX ORDER — FLUTICASONE PROPIONATE 50 MCG
1 SPRAY, SUSPENSION NASAL
Qty: 1 | Refills: 0
Start: 2021-12-13 | End: 2022-01-11

## 2021-12-13 RX ORDER — ACETAMINOPHEN 500 MG
2 TABLET ORAL
Qty: 0 | Refills: 0 | DISCHARGE
Start: 2021-12-13

## 2021-12-13 RX ORDER — ASPIRIN/CALCIUM CARB/MAGNESIUM 324 MG
1 TABLET ORAL
Qty: 0 | Refills: 0 | DISCHARGE
Start: 2021-12-13

## 2021-12-13 RX ORDER — POLYETHYLENE GLYCOL 3350 17 G/17G
17 POWDER, FOR SOLUTION ORAL
Qty: 0 | Refills: 0 | DISCHARGE
Start: 2021-12-13

## 2021-12-13 RX ORDER — TRAZODONE HCL 50 MG
0 TABLET ORAL
Qty: 0 | Refills: 0 | DISCHARGE

## 2021-12-13 RX ORDER — TAMSULOSIN HYDROCHLORIDE 0.4 MG/1
1 CAPSULE ORAL
Qty: 0 | Refills: 0 | DISCHARGE
Start: 2021-12-13

## 2021-12-13 RX ADMIN — POLYETHYLENE GLYCOL 3350 17 GRAM(S): 17 POWDER, FOR SOLUTION ORAL at 05:00

## 2021-12-13 RX ADMIN — Medication 1 SPRAY(S): at 17:04

## 2021-12-13 RX ADMIN — SODIUM CHLORIDE 3 MILLILITER(S): 9 INJECTION INTRAMUSCULAR; INTRAVENOUS; SUBCUTANEOUS at 05:05

## 2021-12-13 RX ADMIN — Medication 100 MILLIGRAM(S): at 04:59

## 2021-12-13 RX ADMIN — SODIUM CHLORIDE 3 MILLILITER(S): 9 INJECTION INTRAMUSCULAR; INTRAVENOUS; SUBCUTANEOUS at 13:22

## 2021-12-13 RX ADMIN — POLYETHYLENE GLYCOL 3350 17 GRAM(S): 17 POWDER, FOR SOLUTION ORAL at 17:03

## 2021-12-13 RX ADMIN — Medication 81 MILLIGRAM(S): at 12:34

## 2021-12-13 RX ADMIN — SODIUM CHLORIDE 3 MILLILITER(S): 9 INJECTION INTRAMUSCULAR; INTRAVENOUS; SUBCUTANEOUS at 22:23

## 2021-12-13 RX ADMIN — Medication 1 SPRAY(S): at 17:03

## 2021-12-13 RX ADMIN — Medication 25 MILLIGRAM(S): at 04:59

## 2021-12-13 RX ADMIN — Medication 1 SPRAY(S): at 05:00

## 2021-12-13 RX ADMIN — Medication 5 MILLIGRAM(S): at 05:07

## 2021-12-13 RX ADMIN — SERTRALINE 100 MILLIGRAM(S): 25 TABLET, FILM COATED ORAL at 12:34

## 2021-12-13 RX ADMIN — LOSARTAN POTASSIUM 100 MILLIGRAM(S): 100 TABLET, FILM COATED ORAL at 04:59

## 2021-12-13 RX ADMIN — ENOXAPARIN SODIUM 40 MILLIGRAM(S): 100 INJECTION SUBCUTANEOUS at 12:33

## 2021-12-13 RX ADMIN — Medication 100 MILLIGRAM(S): at 12:34

## 2021-12-13 NOTE — DISCHARGE NOTE PROVIDER - NSDCCPCAREPLAN_GEN_ALL_CORE_FT
PRINCIPAL DISCHARGE DIAGNOSIS  Diagnosis: 2019 novel coronavirus disease (COVID-19)  Assessment and Plan of Treatment: You have been diagnosed with the COVID-19 virus during your hospital stay. You must self quarantine to complete a 10 day time period.  Monitor for fevers, shortness of breath and cough primarily.  Monitor your temperature daily to not any changes and increases.    It has been determined that you no longer need hospitalization and can recover while remaining in self-quarantine at home. You should follow the prevention steps below until a healthcare provider or local or state health department says you can return to your normal activities.  1. You should restrict activities outside your home, except for getting medical care.  2. Do not go to work, school, or public areas.  3. Avoid using public transportation, ride-sharing, or taxis.  4. Separate yourself from other people and animals in your home.  5. Call ahead before visiting your doctor.  6. Wear a facemask.  7. Cover your coughs and sneezes.  8. Clean your hands often.  9. Avoid sharing personal household items.  10. Clean all “high-touch” surfaces everyday.  11. Monitor your symptoms.  If you have a medical emergency and need to call 911, notify the dispatch personnel that you have COVID-19 If possible, put on a facemask before emergency medical services arrive.  12. Stopping home isolation.  Patients with confirmed COVID-19 should remain under home isolation precautions for 10 days since the positive COVID-19 test and until the risk of secondary transmission to others is thought to be low. The decision to discontinue home isolation precautions should be made on a case-by-case basis, in consultation with the primary health care provide. You can call the [facility] [phone] or [facility] at [phone] for further information about COVID-19.   continue xarelto 10mg oral daily for 30 days   [facility] to call your for an appointment      SECONDARY DISCHARGE DIAGNOSES  Diagnosis: Suicidal ideation  Assessment and Plan of Treatment: follow up with your  psychiatrist Dr. Robertson within 1 week please call for an appointment    Diagnosis: Elevated troponin  Assessment and Plan of Treatment: likely due to infection . No evidence of cardiac injury.   you were evaluated by cardiology .   follow up with Dr. Del Real on ____________________________

## 2021-12-13 NOTE — DISCHARGE NOTE PROVIDER - PROVIDER TOKENS
PROVIDER:[TOKEN:[[id number]],FOLLOWUP:[1 week]],PROVIDER:[TOKEN:[[id number]],FOLLOWUP:[1 week]]

## 2021-12-13 NOTE — DISCHARGE NOTE PROVIDER - CARE PROVIDER_API CALL
[facility]  [address]  [place] [address]  Phone: [phone]  Fax: [phone]  Follow Up Time: 1 week    Henry Graham)  Cardiology; Internal Medicine  [address] [place], [address] [place] [address]  Phone: [phone]  Fax: [phone]  Follow Up Time: 1 week

## 2021-12-13 NOTE — BH CONSULTATION LIAISON PROGRESS NOTE - NSBHCHARTREVIEWLAB_PSY_A_CORE FT
LABS:  cret                        12.5   5.95  )-----------( 191      ( 13 Dec 2021 06:57 )             41.2     12-13    140  |  101  |  22  ----------------------------<  82  3.5   |  25  |  0.83    Ca    10.0      13 Dec 2021 06:57  Phos  3.2     12-13  Mg     1.80     12-13      PTT - ( 12 Dec 2021 01:54 )  PTT:32.0 sec

## 2021-12-13 NOTE — CHART NOTE - NSCHARTNOTEFT_GEN_A_CORE
Attempted to call PMD dr. Del Real 5 times. On hold for > 25 min . [place] program emailed for follow up ,appointment . patient to follow up with PMD

## 2021-12-13 NOTE — DISCHARGE NOTE NURSING/CASE MANAGEMENT/SOCIAL WORK - NSSCNAMETXT_GEN_ALL_CORE
[facility] [phone]  Nurse to visit on the day after disharge.  Other appropriate services to be arranged thereafter.  Please contact Home Care agency  at the above phone# if you have not heard from them by approximately 12 noon on the day after your hospital discharge.

## 2021-12-13 NOTE — BH CONSULTATION LIAISON PROGRESS NOTE - CURRENT MEDICATION
MEDICATIONS  (STANDING):  aspirin enteric coated 81 milliGRAM(s) Oral daily  atorvastatin 80 milliGRAM(s) Oral at bedtime  benzonatate 100 milliGRAM(s) Oral three times a day  bisacodyl 5 milliGRAM(s) Oral at bedtime  dextrose 40% Gel 15 Gram(s) Oral once  dextrose 5%. 1000 milliLiter(s) (50 mL/Hr) IV Continuous <Continuous>  dextrose 5%. 1000 milliLiter(s) (100 mL/Hr) IV Continuous <Continuous>  dextrose 50% Injectable 25 Gram(s) IV Push once  dextrose 50% Injectable 12.5 Gram(s) IV Push once  dextrose 50% Injectable 25 Gram(s) IV Push once  diazepam    Tablet 5 milliGRAM(s) Oral two times a day  enoxaparin Injectable 40 milliGRAM(s) SubCutaneous daily  fluticasone propionate 50 MICROgram(s)/spray Nasal Spray 1 Spray(s) Both Nostrils two times a day  glucagon  Injectable 1 milliGRAM(s) IntraMuscular once  influenza  Vaccine (HIGH DOSE) 0.7 milliLiter(s) IntraMuscular once  insulin lispro (ADMELOG) corrective regimen sliding scale   SubCutaneous three times a day before meals  insulin lispro (ADMELOG) corrective regimen sliding scale   SubCutaneous at bedtime  losartan 100 milliGRAM(s) Oral daily  melatonin 3 milliGRAM(s) Oral at bedtime  metoprolol succinate ER 25 milliGRAM(s) Oral daily  polyethylene glycol 3350 17 Gram(s) Oral two times a day  senna 2 Tablet(s) Oral at bedtime  sertraline 100 milliGRAM(s) Oral daily  sodium chloride 0.65% Nasal 1 Spray(s) Both Nostrils two times a day  sodium chloride 0.9% lock flush 3 milliLiter(s) IV Push every 8 hours  tamsulosin 0.4 milliGRAM(s) Oral at bedtime  traZODone 50 milliGRAM(s) Oral at bedtime    MEDICATIONS  (PRN):  acetaminophen     Tablet .. 650 milliGRAM(s) Oral every 6 hours PRN Temp greater or equal to 38C (100.4F), Mild Pain (1 - 3)  guaiFENesin Oral Liquid (Sugar-Free) 100 milliGRAM(s) Oral every 6 hours PRN Cough  levalbuterol Inhalation 0.63 milliGRAM(s) Inhalation every 6 hours PRN Shortness of breath / wheezing

## 2021-12-13 NOTE — CHART NOTE - NSCHARTNOTEFT_GEN_A_CORE
75-year-old man with diabetes, hypertension and dyslipidemia who presents with dyspnea and persistent cough noted to have an elevated hs-troponin, unlikely to be ACS, moreso from demand.     Echo obtained and reviewed, with normal LV function and no WMA.   Continue aspirin, high potency statin, losartan and beta-blocker.     Cardiology will sign off at this time.     Eunice aTm MD  Cardiology Fellow, PGY-5  [phone]  For all other Cardiology service contact information, go to [email or web address] and use "[email or web address]" to login.

## 2021-12-13 NOTE — BH CONSULTATION LIAISON PROGRESS NOTE - NSBHFUPINTERVALHXFT_PSY_A_CORE
No interval events. No PRN medications for anxiety. The patient reports that he has been sleeping poorly and still feels anxious. He asks to go home as soon as possible because he misses his family and finds the hospital environment very challenging. He denies SIIP. He denies AH/VH. He reports that his anxiety is about the same. He appears overall sluggish and sedated, possibly from AM Valium.

## 2021-12-13 NOTE — DISCHARGE NOTE NURSING/CASE MANAGEMENT/SOCIAL WORK - PATIENT PORTAL LINK FT
You can access the FollowMyHealth Patient Portal offered by [facility] by registering at the following website: [email or web address]. By joining [employer]’s FollowMyHealth portal, you will also be able to view your health information using other applications (apps) compatible with our system.

## 2021-12-13 NOTE — DISCHARGE NOTE PROVIDER - HOSPITAL COURSE
75M history of OA, HTN, DM2, Hyperlipidemia, Depression, Anxiety, admitted for SI, chest pain with elevated Troponin.    Problem/Plan - 1:  NSTEMI (non-ST elevation myocardial infarction).   Cardiology following, low suspicion for ACS  C/w ASA, BB, statin  Patient had episode of chest pain in setting of anxiety, repeat EKG with no abnormalities noted, likely 2/2 anxiety  Elevated Troponin [phone], but unlikely ACS per cardiology  TTE reviewed- normal LV systolic function, grade I diastolic dysfunction  Cardiology signed off, doubt ACS, no further intervention  Monitor on telemetry.    Problem/Plan - 2:  : Suicide ideation.   Psych consulted, appreciate recs  No need for 1:1, d/carissa, no active SI, In good spirits, wants to go home  c/w zoloft 100mg qd, home Valium 5mg PO BID prn, trazodone 50mg qHS prn, melatonin 3mg PO qHs.  follow up with psychiatry as outpt       Problem/Plan - 3:  SOB (shortness of breath).   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   - CTPA with findings of few bilateral upper lung ill-defined small groundglass nodular opacities and pleural plaques  new since 6/17/2016 may represent small foci of infection/ inflammation.  - C/w Xopenex Nebs Q6h PRN  - Also with cough, likely post-viral, c/w tessalon perles and Robitussin PRN  - Not hypoxic, continue to monitor  -Dispo: PT recs rehab, but patient and his wife want to take him home with home PT, d/w CM  wife  updated regarding plan of care by attending on 12/13     Problem/Plan - 4:   2019 novel coronavirus disease (COVID-19).   : COVID PCR positive on admission, however patient previously had COVID last month  -Not currently symptomatic, afebrile  -last covid + on 12/9, likely asymptomatic viral shedding. Satting well on RA  -D/w infection control on Monday if patient needs to remain on isolation.    Problem/Plan - 5:   Urinary hesitancy.   Patient with difficulty urinating into toilet and urinal, states he feels he has to go but nothing comes out  - Bladder scan without evidence of urinary retention  - UA negative  - C/w bowel regimen.    Problem/Plan - 6:  Constipation.   ·  Plan: Per spouse, pt's last BM may have been weeks ago, may be contributing to urinary hesitancy  - Constipation likely due to Oxy IR for OA  - C/w Miralax and Senna, Dulcolax PO  - Dulcolax suppository prn  - Tap water enema if patient does not have a BM with suppository.    Problem/Plan - 7:  : Anxiety.   Continue Diazepam 5 mg po BID PRN.    Problem/Plan - 8:  Other depression.   : Continue Sertraline and Trazadone  Psych consult appreciated.    Problem/Plan - 9:  ·  Problem: Diabetes mellitus, type 2.   ·  Plan: A1c 6.1%  FS QID  ISS  Consistent carb diet.    Problem/Plan - 10:  Hypertension.    BP well controlled  Low salt diet.  C/w losartan.    Problem/Plan - 11:  : Hyperlipidemia.   LDL close to goal  C/w atorvastatin.    Problem/Plan - 12:   Need for prophylactic measure.    DVT ppx: Lovenox  Diet: consistent carb, DASH.    Patient medically stable for discharge home today with home PT

## 2021-12-13 NOTE — DISCHARGE NOTE PROVIDER - NSDCMRMEDTOKEN_GEN_ALL_CORE_FT
ALBUTEROL SULFATE HFA HFA INH:   diazePAM 5 mg oral tablet: 1 tab(s) orally 2 times a day  HYDROCODONE BITARTRATE/AC 5-325MG TAB:   LOSARTAN POTASSIUM 100MG TAB:   MELOXICAM 15MG TAB:   METFORMIN HYDROCHLORIDE 500MG TAB:   SERTRALINE HYDROCHLORIDE 100MG TAB:   SIMVASTATIN 20MG TAB:   TRAZODONE HYDROCHLORIDE 50MG TAB:    acetaminophen 325 mg oral tablet: 2 tab(s) orally every 6 hours, As needed, Temp greater or equal to 38C (100.4F), Mild Pain (1 - 3)  ALBUTEROL SULFATE HFA HFA INH:   aspirin 81 mg oral delayed release tablet: 1 tab(s) orally once a day  benzonatate 100 mg oral capsule: 1 cap(s) orally 3 times a day  bisacodyl 5 mg oral delayed release tablet: 1 tab(s) orally once a day (at bedtime)  diazePAM 5 mg oral tablet: 1 tab(s) orally 2 times a day, As Needed  fluticasone 50 mcg/inh nasal spray: 1 spray(s) nasal 2 times a day  guaiFENesin 100 mg/5 mL oral liquid: 5 milliliter(s) orally every 6 hours, As needed, Cough  losartan 100 mg oral tablet: 1 tab(s) orally once a day  METFORMIN HYDROCHLORIDE 500MG TAB:   metoprolol succinate 25 mg oral tablet, extended release: 1 tab(s) orally once a day  polyethylene glycol 3350 oral powder for reconstitution: 17 gram(s) orally 2 times a day  senna oral tablet: 2 tab(s) orally once a day (at bedtime)  sertraline 100 mg oral tablet: 1 tab(s) orally once a day  SIMVASTATIN 20MG TAB:   sodium chloride 0.65% nasal spray: 1  nasal 4 times a day, As Needed - for congestion  tamsulosin 0.4 mg oral capsule: 1 cap(s) orally once a day (at bedtime)  traZODone 50 mg oral tablet: 1 tab(s) orally once a day (at bedtime), As needed, insomnia  Xarelto 10 mg oral tablet: 1 tab(s) orally once a day

## 2021-12-13 NOTE — BH CONSULTATION LIAISON PROGRESS NOTE - NSBHASSESSMENTFT_PSY_ALL_CORE
76 y/o M, , living with wife, daughter, and grandchildren in a house in [place], past psychiatric history of anxiety/depression (sees outpt psychiatrist Dr. Robertson in [place]),no known substance or alcohol abuse, no known prior suicide attempts w/ PMH HTN, DM2, HLD,  diagnosed with COVID 11/2021 presents to the ED with nonexertional SOB, cough with white phlegm, substernal CP that radiates to L arm. On that same day, pt requested his wife to give him all of his sleeping pills on 12/9 because he was "tired of the way he was living." Psychiatry was consulted for suicidal ideation.    The patient presents to the hospital due to worsening dyspnea and anxiety in the context of recent COVID pneumonia, and was found to have nSTEMI. He reports worsening anxiety and sleeplessness over the last month, leading him to make statements about taking sleeping pills. He denies suicidal ideation or intention and clarifies that he doesn't want to die, he just wants some relief from his anxiety. He has no past history of suicide attempts and is in treatment with a psychiatrist and is adherent with medications. He cites his family as a reason to live and would never leave them on purpose. He reports worsening anxiety over the last month due to his illness and has been using his Valium 5mg 1-2x per day. He also endorses chronic insomnia for which he takes Trazodone at home. His chronic symptoms appear to have been exacerbated by this acute illness and the patient has been removed from his home environment which clearly is destabilizing for him, likely worsening his feelings of anxious distress. He also presents with limited attention suggesting some component of acute delirium.    12/13- Appears sedated today, likely due to Valium, continue to recommend Valium as PRN not standing medication. Alternatively can reduce standing dose to 2.5mg BID.     Plan:  -No psychiatric indication for 1:1 CO, the patient denies any SIIP and is overall low acute risk for suicide  Standing:  -CONTINUE home Zoloft 100mg daily for depression/anxiety  -START Melatonin 3mg for insomnia  PRN:  -CHANGE Valium PO 5mg to PRN BID for anxiety with hold orders for sedation or respiratory depression, monitor pulse OX closely  -CONTINUE Trazodone 50 mg PO PRN at bedtime for insomnia  -Psych will sign off. Please call with questions. Instruct pt to f/u with his outpt psychiatrist after discharge

## 2021-12-13 NOTE — BH CONSULTATION LIAISON PROGRESS NOTE - MSE UNSTRUCTURED FT
Appearance: fair grooming and hygiene, elderly overweight man, has well groomed mustache, appears tired  Behavior: fair eye contact. Pt cooperative with exam  Motor: psychomotor retardation  Speech: low volume, slightly latent speech, less clear articulation  Mood: anxious  Affect: appears sad, constricted  Thought process: linear, logical  Thought content: misses family, upset about being in the hospital. denies any SI/HI. Denies violent thoughts or paranoia. no delusional content was elicited  Cognition: AxO x 2. Limited attention (unable to list months of the year backwards or spell WORLD backwards)  Perceptions: denies any AH/VH  Insight: poor  Judgement: fair  Impulse control: fair at time of interview

## 2021-12-13 NOTE — DISCHARGE NOTE NURSING/CASE MANAGEMENT/SOCIAL WORK - NSDCPEFALRISK_GEN_ALL_CORE
For information on Fall & Injury Prevention, visit: [email or web address] OR  [email or web address] OR  https://www.cdc.gov/steadi/patient.html

## 2021-12-14 ENCOUNTER — APPOINTMENT (OUTPATIENT)
Dept: PULMONOLOGY | Facility: CLINIC | Age: 76
End: 2021-12-14
Payer: MEDICARE

## 2021-12-14 VITALS
OXYGEN SATURATION: 97 % | DIASTOLIC BLOOD PRESSURE: 70 MMHG | TEMPERATURE: 98 F | HEART RATE: 67 BPM | SYSTOLIC BLOOD PRESSURE: 114 MMHG | RESPIRATION RATE: 18 BRPM

## 2021-12-14 DIAGNOSIS — Z77.090 CONTACT WITH AND (SUSPECTED) EXPOSURE TO ASBESTOS: ICD-10-CM

## 2021-12-14 PROCEDURE — 99203 OFFICE O/P NEW LOW 30 MIN: CPT | Mod: CS,95

## 2021-12-14 PROCEDURE — 99239 HOSP IP/OBS DSCHRG MGMT >30: CPT

## 2021-12-14 RX ORDER — BENZONATATE 100 MG/1
100 CAPSULE ORAL
Refills: 0 | Status: ACTIVE | COMMUNITY
Start: 2021-12-14

## 2021-12-14 RX ORDER — FLUTICASONE PROPIONATE 50 UG/1
50 SPRAY, METERED NASAL
Refills: 0 | Status: ACTIVE | COMMUNITY
Start: 2021-12-14

## 2021-12-14 RX ORDER — RIVAROXABAN 10 MG/1
10 TABLET, FILM COATED ORAL
Qty: 30 | Refills: 0 | Status: ACTIVE | COMMUNITY
Start: 2021-12-14

## 2021-12-14 RX ADMIN — Medication 100 MILLIGRAM(S): at 05:08

## 2021-12-14 RX ADMIN — Medication 1 SPRAY(S): at 05:08

## 2021-12-14 RX ADMIN — Medication 25 MILLIGRAM(S): at 05:08

## 2021-12-14 RX ADMIN — LOSARTAN POTASSIUM 100 MILLIGRAM(S): 100 TABLET, FILM COATED ORAL at 05:08

## 2021-12-14 RX ADMIN — POLYETHYLENE GLYCOL 3350 17 GRAM(S): 17 POWDER, FOR SOLUTION ORAL at 05:08

## 2021-12-14 RX ADMIN — SODIUM CHLORIDE 3 MILLILITER(S): 9 INJECTION INTRAMUSCULAR; INTRAVENOUS; SUBCUTANEOUS at 05:11

## 2021-12-14 NOTE — PROGRESS NOTE ADULT - PROBLEM SELECTOR PLAN 8
Continue Sertraline and Trazadone  Psych consult appreciated
Continue Sertraline and Trazadone  Psych consult appreciated
A1c 6.1%  FS QID  ISS  Consistent carb diet
Continue Sertraline and Trazadone  Psych consult appreciated
Continue Sertraline and Trazadone  Psych consult appreciated

## 2021-12-14 NOTE — PROGRESS NOTE ADULT - PROBLEM SELECTOR PROBLEM 12
Preventive measure
Need for prophylactic measure

## 2021-12-14 NOTE — HISTORY OF PRESENT ILLNESS
[Home] : at home, [unfilled] , at the time of the visit. [Medical Office: ([facility])___] : at the medical office located in  [Verbal consent obtained from patient] : the patient, [unfilled] [FreeTextEntry1] : Follow up hospitalization for Covid 19 infection- discharged this morning (12/9-12/14); Hospital stay significant for NSTEMI and SI- followed by cardiology and psych. Has SOB with exertion. Discharged home on benzonatate and Xarelto for DVT prophylaxis ^ Covid PNA.   \par \par \par \par EXAM: CT ANGIO CHEST PULM ART JUDI\par PROCEDURE DATE: 12/09/202q\par INTERPRETATION: CLINICAL INFORMATION: Chest tightness with hypoxia, concern for pulmonary embolism\par COMPARISON: CTA chest from 6/17/2016, chest radiograph from earlier the same day\par \par FINDINGS:\par LUNGS AND AIRWAYS: Patent central airways. Right lung base areas of atelectasis adjacent to the elevated diaphragm within the right lower lobe and the right middle lobe.\par \par Subtle few bilateral mid to upper lung ill-defined groundglass small nodular opacities, most notable within the peripheral aspect of the right upper lobe are new since 2/17/2016.\par \par PLEURA: Bilateral pleural plaques.\par MEDIASTINUM AND STORMY: No lymphadenopathy.\par VESSELS: Limited evaluation of some of the subsegmental pulmonary arterial branches at the left lung base due to poor contrast opacification. No pulmonary arterial emboli within the well visualized and opacified pulmonary arteries. Coronary artery and aortic calcifications. Aortic valve calcifications.\par HEART: Heart size is normal. No pericardial effusion.\par CHEST WALL AND LOWER NECK: Bilateral gynecomastia, right greater than left as on the prior study.\par VISUALIZED UPPER ABDOMEN: Moderate to severe elevation of the right hemidiaphragm, unchanged. Status post sleeve gastrectomy.\par BONES: Degenerative changes. 8 mm sclerotic focus within the T5 vertebral body is unchanged.\par \par IMPRESSION:\par Limited evaluation of some of the small subsegmental pulmonary arterial branches due to poor contrast opacification. No pulmonary arterial emboli within the well opacified pulmonary arteries.\par Few bilateral upper lung ill-defined small groundglass nodular opacities new since 6/17/2016 may represent small foci of infection/inflammation. A 3 month follow-up noncontrast chest CT is recommended to ensure resolution.\par \par \par \par \par \par ,

## 2021-12-14 NOTE — PROGRESS NOTE ADULT - ASSESSMENT
75M history of OA, HTN, DM2, Hyperlipidemia, Depression, Anxiety, admitted for SI, chest pain with elevated Troponin.
75M history of OA, HTN, DM2, Hyperlipidemia, Depression, Anxiety, admitted for SI, chest pain with elevated Troponin.
75M history of OA, HTN, DM2, Hyperlipidemia, Depression, Anxiety, admitted for SI, chest pain with elevated Troponin started on a Heparin drip.
75M history of OA, HTN, DM2, Hyperlipidemia, Depression, Anxiety, admitted for SI, chest pain with elevated Troponin started on a Heparin drip.
75M history of OA, HTN, DM2, Hyperlipidemia, Depression, Anxiety, admitted for SI, chest pain with elevated Troponin.

## 2021-12-14 NOTE — PROGRESS NOTE ADULT - PROBLEM SELECTOR PLAN 1
Cardiology following  C/w ASA, BB, statin, heparin drip  Troponin [phone]  Currently chest pain free  TTE ordered  Monitor on telemetry
Cardiology following, low suspicion for ACS  C/w ASA, BB, statin  Patient had episode of chest pain in setting of anxiety, repeat EKG with no abnormalities noted, likely 2/2 anxiety  Elevated Troponin [phone], but unlikely ACS per cardiology  TTE reviewed- normal LV systolic function, grade I diastolic dysfunction  Cardiology signed off, doubt ACS, no further intervention  Monitor on telemetry
Cardiology following, low suspicion for ACS  C/w ASA, BB, statin  Patient with episode of chest pain today in setting of anxiety, repeat EKG with no abnormalities noted, likely 2/2 anxiety  Troponin [phone]  TTE reviewed- normal LV systolic function, grade I diastolic dysfunction  Monitor on telemetry
Cardiology following, low suspicion for ACS  C/w ASA, BB, statin  Heparin drip d/carissa  Patient with episode of chest pain today in setting of anxiety, repeat EKG with no abnormalities noted  Troponin [phone]  TTE reviewed- normal LV systolic function, grade I diastolic dysfunction  Monitor on telemetry
Cardiology following, low suspicion for ACS  C/w ASA, BB, statin  Patient had episode of chest pain in setting of anxiety, repeat EKG with no abnormalities noted, likely 2/2 anxiety  Elevated Troponin [phone], but unlikely ACS per cardiology  TTE reviewed- normal LV systolic function, grade I diastolic dysfunction  Cardiology signed off, doubt ACS, no further intervention  Monitor on telemetry

## 2021-12-14 NOTE — PROGRESS NOTE ADULT - PROBLEM SELECTOR PLAN 7
Continue Sertraline and Trazadone  Psych consult appreciated
Continue Diazepam 5 mg po BID PRN

## 2021-12-14 NOTE — PROGRESS NOTE ADULT - SUBJECTIVE AND OBJECTIVE BOX
Eduarda Hernadez  [facility]  Pager #[phone]    Patient is a 75y old  Male who presents with a chief complaint of SOB x few weeks (11 Dec 2021 18:46)      SUBJECTIVE / OVERNIGHT EVENTS: Patient seen and examined at bedside. Patient still feeling anxious, states that he feels nervous around doctors. Still no BM since admission, still having urinary frequency and hesitancy.     ADDITIONAL REVIEW OF SYSTEMS:    MEDICATIONS  (STANDING):  aspirin enteric coated 81 milliGRAM(s) Oral daily  atorvastatin 80 milliGRAM(s) Oral at bedtime  benzonatate 100 milliGRAM(s) Oral three times a day  bisacodyl 5 milliGRAM(s) Oral at bedtime  dextrose 40% Gel 15 Gram(s) Oral once  dextrose 5%. 1000 milliLiter(s) (50 mL/Hr) IV Continuous <Continuous>  dextrose 5%. 1000 milliLiter(s) (100 mL/Hr) IV Continuous <Continuous>  dextrose 50% Injectable 25 Gram(s) IV Push once  dextrose 50% Injectable 12.5 Gram(s) IV Push once  dextrose 50% Injectable 25 Gram(s) IV Push once  diazepam    Tablet 5 milliGRAM(s) Oral two times a day  enoxaparin Injectable 40 milliGRAM(s) SubCutaneous daily  fluticasone propionate 50 MICROgram(s)/spray Nasal Spray 1 Spray(s) Both Nostrils two times a day  glucagon  Injectable 1 milliGRAM(s) IntraMuscular once  influenza  Vaccine (HIGH DOSE) 0.7 milliLiter(s) IntraMuscular once  insulin lispro (ADMELOG) corrective regimen sliding scale   SubCutaneous three times a day before meals  insulin lispro (ADMELOG) corrective regimen sliding scale   SubCutaneous at bedtime  losartan 100 milliGRAM(s) Oral daily  melatonin 3 milliGRAM(s) Oral at bedtime  metoprolol succinate ER 25 milliGRAM(s) Oral daily  polyethylene glycol 3350 17 Gram(s) Oral two times a day  senna 2 Tablet(s) Oral at bedtime  sertraline 100 milliGRAM(s) Oral daily  sodium chloride 0.65% Nasal 1 Spray(s) Both Nostrils two times a day  sodium chloride 0.9% lock flush 3 milliLiter(s) IV Push every 8 hours  tamsulosin 0.4 milliGRAM(s) Oral at bedtime  traZODone 50 milliGRAM(s) Oral at bedtime    MEDICATIONS  (PRN):  acetaminophen     Tablet .. 650 milliGRAM(s) Oral every 6 hours PRN Temp greater or equal to 38C (100.4F), Mild Pain (1 - 3)  guaiFENesin Oral Liquid (Sugar-Free) 100 milliGRAM(s) Oral every 6 hours PRN Cough  levalbuterol Inhalation 0.63 milliGRAM(s) Inhalation every 6 hours PRN Shortness of breath / wheezing      CAPILLARY BLOOD GLUCOSE      POCT Blood Glucose.: 91 mg/dL (12 Dec 2021 12:55)  POCT Blood Glucose.: 92 mg/dL (12 Dec 2021 09:04)  POCT Blood Glucose.: 92 mg/dL (11 Dec 2021 21:13)  POCT Blood Glucose.: 103 mg/dL (11 Dec 2021 17:20)    I&O's Summary    11 Dec 2021 07:01  -  12 Dec 2021 07:00  --------------------------------------------------------  IN: 0 mL / OUT: 250 mL / NET: -250 mL        PHYSICAL EXAM:    Vital Signs Last 24 Hrs  T(C): 36.7 (12 Dec 2021 05:44), Max: 36.7 (12 Dec 2021 05:44)  T(F): 98.1 (12 Dec 2021 05:44), Max: 98.1 (12 Dec 2021 05:44)  HR: 77 (12 Dec 2021 05:44) (61 - 77)  BP: 122/72 (12 Dec 2021 05:44) (117/66 - 143/74)  BP(mean): --  RR: 18 (12 Dec 2021 05:44) (18 - 18)  SpO2: 94% (12 Dec 2021 05:44) (94% - 96%)    CONSTITUTIONAL: NAD, well-developed, obese  EYES: Conjunctiva and sclera clear  ENMT: Moist oral mucosa  RESPIRATORY: Normal respiratory effort; lungs are clear to auscultation bilaterally  CARDIOVASCULAR: Regular rate and rhythm, normal S1 and S2, no murmur/rub/gallop; No lower extremity edema  ABDOMEN: Nontender to palpation, normoactive bowel sounds  PSYCH: A+O to person, place, time  NEUROLOGY: No focal deficits  SKIN: No rashes; no palpable lesions    LABS:                        13.0   5.65  )-----------( 199      ( 12 Dec 2021 08:30 )             41.7         141  |  103  |  25<H>  ----------------------------<  92  3.8   |  21<L>  |  0.91    Ca    9.9      12 Dec 2021 08:30  Phos  3.3       Mg     1.70           PTT - ( 12 Dec 2021 01:54 )  PTT:32.0 sec  CARDIAC MARKERS ( 11 Dec 2021 17:31 )  x     / x     / x     / x     / 9.6 ng/mL      Urinalysis Basic - ( 12 Dec 2021 04:09 )    Color: Yellow / Appearance: Clear / S.032 / pH: x  Gluc: x / Ketone: Small  / Bili: Small / Urobili: 6 mg/dL   Blood: x / Protein: 30 mg/dL / Nitrite: Negative   Leuk Esterase: Negative / RBC: 3 /HPF / WBC 1 /HPF   Sq Epi: x / Non Sq Epi: 1 /HPF / Bacteria: Negative      RADIOLOGY & ADDITIONAL TESTS: No new imaging  Results Reviewed: Yes  Imaging Personally Reviewed:  Electrocardiogram Personally Reviewed:    COORDINATION OF CARE:  Care Discussed with Consultants/Other Providers [Y/N]:  Prior or Outpatient Records Reviewed [Y/N]:  
Dr. Tabitha Montana  Pager [phone]    PROGRESS NOTE:     Patient is a 76y old  Male who presents with a chief complaint of SOB x few weeks (13 Dec 2021 14:10)      SUBJECTIVE / OVERNIGHT EVENTS: pt didn't get discharged yesterday as wife didn't come to pick him up  ADDITIONAL REVIEW OF SYSTEMS: afebrile, mentating well, saying [birth year] birthday, going home today    MEDICATIONS  (STANDING):  aspirin enteric coated 81 milliGRAM(s) Oral daily  atorvastatin 80 milliGRAM(s) Oral at bedtime  benzonatate 100 milliGRAM(s) Oral three times a day  bisacodyl 5 milliGRAM(s) Oral at bedtime  dextrose 40% Gel 15 Gram(s) Oral once  dextrose 5%. 1000 milliLiter(s) (50 mL/Hr) IV Continuous <Continuous>  dextrose 5%. 1000 milliLiter(s) (100 mL/Hr) IV Continuous <Continuous>  dextrose 50% Injectable 25 Gram(s) IV Push once  dextrose 50% Injectable 12.5 Gram(s) IV Push once  dextrose 50% Injectable 25 Gram(s) IV Push once  enoxaparin Injectable 40 milliGRAM(s) SubCutaneous daily  fluticasone propionate 50 MICROgram(s)/spray Nasal Spray 1 Spray(s) Both Nostrils two times a day  glucagon  Injectable 1 milliGRAM(s) IntraMuscular once  influenza  Vaccine (HIGH DOSE) 0.7 milliLiter(s) IntraMuscular once  insulin lispro (ADMELOG) corrective regimen sliding scale   SubCutaneous three times a day before meals  insulin lispro (ADMELOG) corrective regimen sliding scale   SubCutaneous at bedtime  losartan 100 milliGRAM(s) Oral daily  melatonin 3 milliGRAM(s) Oral at bedtime  metoprolol succinate ER 25 milliGRAM(s) Oral daily  polyethylene glycol 3350 17 Gram(s) Oral two times a day  senna 2 Tablet(s) Oral at bedtime  sertraline 100 milliGRAM(s) Oral daily  sodium chloride 0.65% Nasal 1 Spray(s) Both Nostrils two times a day  sodium chloride 0.9% lock flush 3 milliLiter(s) IV Push every 8 hours  tamsulosin 0.4 milliGRAM(s) Oral at bedtime    MEDICATIONS  (PRN):  acetaminophen     Tablet .. 650 milliGRAM(s) Oral every 6 hours PRN Temp greater or equal to 38C (100.4F), Mild Pain (1 - 3)  diazepam    Tablet 5 milliGRAM(s) Oral two times a day PRN anxiety  guaiFENesin Oral Liquid (Sugar-Free) 100 milliGRAM(s) Oral every 6 hours PRN Cough  levalbuterol Inhalation 0.63 milliGRAM(s) Inhalation every 6 hours PRN Shortness of breath / wheezing  traZODone 50 milliGRAM(s) Oral at bedtime PRN insomnia      CAPILLARY BLOOD GLUCOSE      POCT Blood Glucose.: 94 mg/dL (14 Dec 2021 08:23)  POCT Blood Glucose.: 103 mg/dL (13 Dec 2021 21:00)  POCT Blood Glucose.: 94 mg/dL (13 Dec 2021 17:25)    I&O's Summary      PHYSICAL EXAM:  Vital Signs Last 24 Hrs  T(C): 36.7 (14 Dec 2021 05:05), Max: 36.7 (14 Dec 2021 05:05)  T(F): 98.1 (14 Dec 2021 05:05), Max: 98.1 (14 Dec 2021 05:05)  HR: 67 (14 Dec 2021 05:05) (66 - 67)  BP: 114/70 (14 Dec 2021 05:05) (106/67 - 114/70)  BP(mean): --  RR: 18 (14 Dec 2021 05:05) (18 - 18)  SpO2: 97% (14 Dec 2021 05:05) (95% - 97%)  CONSTITUTIONAL: NAD, well-developed, obese  EYES: Conjunctiva and sclera clear  ENMT: Moist oral mucosa  RESPIRATORY: Normal respiratory effort; lungs are clear, decreased air entry  CARDIOVASCULAR: Regular rate and rhythm, normal S1 and S2, no murmur/rub/gallop; No lower extremity edema  ABDOMEN: Nontender to palpation, normoactive bowel sounds  PSYCH: A+O to person, place, time  NEUROLOGY: No focal deficits  SKIN: No rashes; no palpable lesions    LABS:                        12.5   5.95  )-----------( 191      ( 13 Dec 2021 06:57 )             41.2     12-13    140  |  101  |  22  ----------------------------<  82  3.5   |  25  |  0.83    Ca    10.0      13 Dec 2021 06:57  Phos  3.2     12-13  Mg     1.80     12-13      RADIOLOGY & ADDITIONAL TESTS:  Results Reviewed:   Imaging Personally Reviewed:  Electrocardiogram Personally Reviewed:    COORDINATION OF CARE:  Care Discussed with Consultants/Other Providers [Y/N]: kimberly Dent dc home today with home PT  Prior or Outpatient Records Reviewed [Y/N]:  
Dr. Tabitha Montana  Pager [phone]    PROGRESS NOTE:     Patient is a 75y old  Male who presents with a chief complaint of SOB x few weeks (13 Dec 2021 12:41)      SUBJECTIVE / OVERNIGHT EVENTS: pt denies chest pain or sob   ADDITIONAL REVIEW OF SYSTEMS: on RA, wants to go home with home PT, not rehab, had BM yesterday     MEDICATIONS  (STANDING):  aspirin enteric coated 81 milliGRAM(s) Oral daily  atorvastatin 80 milliGRAM(s) Oral at bedtime  benzonatate 100 milliGRAM(s) Oral three times a day  bisacodyl 5 milliGRAM(s) Oral at bedtime  dextrose 40% Gel 15 Gram(s) Oral once  dextrose 5%. 1000 milliLiter(s) (50 mL/Hr) IV Continuous <Continuous>  dextrose 5%. 1000 milliLiter(s) (100 mL/Hr) IV Continuous <Continuous>  dextrose 50% Injectable 25 Gram(s) IV Push once  dextrose 50% Injectable 12.5 Gram(s) IV Push once  dextrose 50% Injectable 25 Gram(s) IV Push once  diazepam    Tablet 5 milliGRAM(s) Oral two times a day  enoxaparin Injectable 40 milliGRAM(s) SubCutaneous daily  fluticasone propionate 50 MICROgram(s)/spray Nasal Spray 1 Spray(s) Both Nostrils two times a day  glucagon  Injectable 1 milliGRAM(s) IntraMuscular once  influenza  Vaccine (HIGH DOSE) 0.7 milliLiter(s) IntraMuscular once  insulin lispro (ADMELOG) corrective regimen sliding scale   SubCutaneous three times a day before meals  insulin lispro (ADMELOG) corrective regimen sliding scale   SubCutaneous at bedtime  losartan 100 milliGRAM(s) Oral daily  melatonin 3 milliGRAM(s) Oral at bedtime  metoprolol succinate ER 25 milliGRAM(s) Oral daily  polyethylene glycol 3350 17 Gram(s) Oral two times a day  senna 2 Tablet(s) Oral at bedtime  sertraline 100 milliGRAM(s) Oral daily  sodium chloride 0.65% Nasal 1 Spray(s) Both Nostrils two times a day  sodium chloride 0.9% lock flush 3 milliLiter(s) IV Push every 8 hours  tamsulosin 0.4 milliGRAM(s) Oral at bedtime  traZODone 50 milliGRAM(s) Oral at bedtime    MEDICATIONS  (PRN):  acetaminophen     Tablet .. 650 milliGRAM(s) Oral every 6 hours PRN Temp greater or equal to 38C (100.4F), Mild Pain (1 - 3)  guaiFENesin Oral Liquid (Sugar-Free) 100 milliGRAM(s) Oral every 6 hours PRN Cough  levalbuterol Inhalation 0.63 milliGRAM(s) Inhalation every 6 hours PRN Shortness of breath / wheezing      CAPILLARY BLOOD GLUCOSE      POCT Blood Glucose.: 107 mg/dL (13 Dec 2021 12:07)  POCT Blood Glucose.: 98 mg/dL (13 Dec 2021 08:33)  POCT Blood Glucose.: 89 mg/dL (12 Dec 2021 21:43)  POCT Blood Glucose.: 95 mg/dL (12 Dec 2021 17:22)    I&O's Summary      PHYSICAL EXAM:  Vital Signs Last 24 Hrs  T(C): 36.8 (13 Dec 2021 11:47), Max: 36.8 (13 Dec 2021 11:47)  T(F): 98.3 (13 Dec 2021 11:47), Max: 98.3 (13 Dec 2021 11:47)  HR: 83 (13 Dec 2021 11:47) (72 - 83)  BP: 128/65 (13 Dec 2021 11:47) (128/65 - 155/82)  BP(mean): --  RR: 18 (13 Dec 2021 11:47) (18 - 18)  SpO2: 94% (13 Dec 2021 11:47) (94% - 98%)  CONSTITUTIONAL: NAD, well-developed, obese  EYES: Conjunctiva and sclera clear  ENMT: Moist oral mucosa  RESPIRATORY: Normal respiratory effort; lungs are clear, decreased air entry  CARDIOVASCULAR: Regular rate and rhythm, normal S1 and S2, no murmur/rub/gallop; No lower extremity edema  ABDOMEN: Nontender to palpation, normoactive bowel sounds  PSYCH: A+O to person, place, time  NEUROLOGY: No focal deficits  SKIN: No rashes; no palpable lesions    LABS:                        12.5   5.95  )-----------( 191      ( 13 Dec 2021 06:57 )             41.2     -    140  |  101  |  22  ----------------------------<  82  3.5   |  25  |  0.83    Ca    10.0      13 Dec 2021 06:57  Phos  3.2     12-13  Mg     1.80     12-13      PTT - ( 12 Dec 2021 01:54 )  PTT:32.0 sec  CARDIAC MARKERS ( 11 Dec 2021 17:31 )  x     / x     / x     / x     / 9.6 ng/mL      Urinalysis Basic - ( 12 Dec 2021 04:09 )    Color: Yellow / Appearance: Clear / S.032 / pH: x  Gluc: x / Ketone: Small  / Bili: Small / Urobili: 6 mg/dL   Blood: x / Protein: 30 mg/dL / Nitrite: Negative   Leuk Esterase: Negative / RBC: 3 /HPF / WBC 1 /HPF   Sq Epi: x / Non Sq Epi: 1 /HPF / Bacteria: Negative      RADIOLOGY & ADDITIONAL TESTS:  Results Reviewed:   Imaging Personally Reviewed:  Electrocardiogram Personally Reviewed:    COORDINATION OF CARE:  Care Discussed with Consultants/Other Providers [Y/N]: kimberly Dent dc home with home PT  Prior or Outpatient Records Reviewed [Y/N]:  
Eduarda Hernadez  Division of Hospital Medicine  Pager #[phone]    Patient is a 75y old  Male who presents with a chief complaint of SOB x few weeks (09 Dec 2021 23:53)      SUBJECTIVE / OVERNIGHT EVENTS: Patient seen and examined at bedside. Patient having urinary hesitancy, feels like he has to go but then nothing is coming out. States that this only started happening recently and he has nocturia as well. Reports having SOB when walking short distances with his walker, denies chest pain or pressure.    ADDITIONAL REVIEW OF SYSTEMS:    MEDICATIONS  (STANDING):  aspirin enteric coated 81 milliGRAM(s) Oral daily  atorvastatin 80 milliGRAM(s) Oral at bedtime  benzonatate 100 milliGRAM(s) Oral three times a day  dextrose 40% Gel 15 Gram(s) Oral once  dextrose 5%. 1000 milliLiter(s) (50 mL/Hr) IV Continuous <Continuous>  dextrose 5%. 1000 milliLiter(s) (100 mL/Hr) IV Continuous <Continuous>  dextrose 50% Injectable 25 Gram(s) IV Push once  dextrose 50% Injectable 12.5 Gram(s) IV Push once  dextrose 50% Injectable 25 Gram(s) IV Push once  fluticasone propionate 50 MICROgram(s)/spray Nasal Spray 1 Spray(s) Both Nostrils two times a day  glucagon  Injectable 1 milliGRAM(s) IntraMuscular once  heparin  Infusion.  Unit(s)/Hr (10 mL/Hr) IV Continuous <Continuous>  influenza  Vaccine (HIGH DOSE) 0.7 milliLiter(s) IntraMuscular once  insulin lispro (ADMELOG) corrective regimen sliding scale   SubCutaneous three times a day before meals  insulin lispro (ADMELOG) corrective regimen sliding scale   SubCutaneous at bedtime  losartan 100 milliGRAM(s) Oral daily  melatonin 3 milliGRAM(s) Oral at bedtime  metoprolol succinate ER 25 milliGRAM(s) Oral daily  polyethylene glycol 3350 17 Gram(s) Oral daily  senna 2 Tablet(s) Oral at bedtime  sertraline 100 milliGRAM(s) Oral daily  sodium chloride 0.9% lock flush 3 milliLiter(s) IV Push every 8 hours  traZODone 50 milliGRAM(s) Oral at bedtime    MEDICATIONS  (PRN):  acetaminophen     Tablet .. 650 milliGRAM(s) Oral every 6 hours PRN Temp greater or equal to 38C (100.4F), Mild Pain (1 - 3)  diazepam    Tablet 5 milliGRAM(s) Oral two times a day PRN Anxiety  guaiFENesin Oral Liquid (Sugar-Free) 100 milliGRAM(s) Oral every 6 hours PRN Cough  heparin   Injectable 4000 Unit(s) IV Push every 6 hours PRN For aPTT less than 40  levalbuterol Inhalation 0.63 milliGRAM(s) Inhalation every 6 hours PRN Shortness of breath / wheezing      CAPILLARY BLOOD GLUCOSE      POCT Blood Glucose.: 116 mg/dL (10 Dec 2021 12:24)  POCT Blood Glucose.: 90 mg/dL (10 Dec 2021 08:51)  POCT Blood Glucose.: 74 mg/dL (09 Dec 2021 23:32)  POCT Blood Glucose.: 71 mg/dL (09 Dec 2021 23:28)  POCT Blood Glucose.: 98 mg/dL (09 Dec 2021 16:51)    I&O's Summary      PHYSICAL EXAM:    Vital Signs Last 24 Hrs  T(C): 36.4 (10 Dec 2021 11:42), Max: 36.8 (09 Dec 2021 15:47)  T(F): 97.5 (10 Dec 2021 11:42), Max: 98.2 (09 Dec 2021 15:47)  HR: 70 (10 Dec 2021 11:42) (70 - 83)  BP: 149/73 (10 Dec 2021 11:42) (104/71 - 187/70)  BP(mean): --  RR: 18 (10 Dec 2021 11:42) (18 - 20)  SpO2: 98% (10 Dec 2021 11:42) (94% - 100%)    CONSTITUTIONAL: NAD, well-developed, obese  EYES: Conjunctiva and sclera clear  ENMT: Moist oral mucosa  RESPIRATORY: Normal respiratory effort; lungs are clear to auscultation bilaterally  CARDIOVASCULAR: Regular rate and rhythm, normal S1 and S2, no murmur/rub/gallop; No lower extremity edema  ABDOMEN: Nontender to palpation, normoactive bowel sounds  PSYCH: A+O to person, place  NEUROLOGY: No focal deficits  SKIN: No rashes; no palpable lesions    LABS:                        12.7   5.09  )-----------( 215      ( 10 Dec 2021 03:26 )             41.5     12-10    140  |  102  |  23  ----------------------------<  77  3.8   |  24  |  0.86    Ca    9.7      10 Dec 2021 03:26  Phos  2.5     12-10  Mg     1.70     12-10    TPro  7.7  /  Alb  4.4  /  TBili  0.7  /  DBili  x   /  AST  25  /  ALT  15  /  AlkPhos  63  12-09    PT/INR - ( 09 Dec 2021 17:25 )   PT: 15.0 sec;   INR: 1.33 ratio         PTT - ( 10 Dec 2021 10:09 )  PTT:66.1 sec  CARDIAC MARKERS ( 10 Dec 2021 00:46 )  x     / x     / 146 U/L / x     / 9.8 ng/mL  CARDIAC MARKERS ( 09 Dec 2021 20:59 )  x     / x     / 153 U/L / x     / x          RADIOLOGY & ADDITIONAL TESTS: No new imaging  Results Reviewed: Yes  Imaging Personally Reviewed:  Electrocardiogram Personally Reviewed:    COORDINATION OF CARE:  Care Discussed with Consultants/Other Providers [Y/N]:  Prior or Outpatient Records Reviewed [Y/N]:  
Eduarda Hernadez  [facility]  Pager #[phone]    Patient is a 75y old  Male who presents with a chief complaint of SOB x few weeks (10 Dec 2021 15:32)      SUBJECTIVE / OVERNIGHT EVENTS: Patient seen and examined at bedside. Patient still having urinary hesitancy, also reports chronic issues with constipation, is unable to state when his last BM was.     ADDITIONAL REVIEW OF SYSTEMS:    MEDICATIONS  (STANDING):  aspirin enteric coated 81 milliGRAM(s) Oral daily  atorvastatin 80 milliGRAM(s) Oral at bedtime  benzonatate 100 milliGRAM(s) Oral three times a day  bisacodyl 5 milliGRAM(s) Oral at bedtime  dextrose 40% Gel 15 Gram(s) Oral once  dextrose 5%. 1000 milliLiter(s) (50 mL/Hr) IV Continuous <Continuous>  dextrose 5%. 1000 milliLiter(s) (100 mL/Hr) IV Continuous <Continuous>  dextrose 50% Injectable 25 Gram(s) IV Push once  dextrose 50% Injectable 12.5 Gram(s) IV Push once  dextrose 50% Injectable 25 Gram(s) IV Push once  diazepam    Tablet 5 milliGRAM(s) Oral two times a day  fluticasone propionate 50 MICROgram(s)/spray Nasal Spray 1 Spray(s) Both Nostrils two times a day  glucagon  Injectable 1 milliGRAM(s) IntraMuscular once  heparin  Infusion.  Unit(s)/Hr (10 mL/Hr) IV Continuous <Continuous>  influenza  Vaccine (HIGH DOSE) 0.7 milliLiter(s) IntraMuscular once  insulin lispro (ADMELOG) corrective regimen sliding scale   SubCutaneous three times a day before meals  insulin lispro (ADMELOG) corrective regimen sliding scale   SubCutaneous at bedtime  losartan 100 milliGRAM(s) Oral daily  melatonin 3 milliGRAM(s) Oral at bedtime  metoprolol succinate ER 25 milliGRAM(s) Oral daily  polyethylene glycol 3350 17 Gram(s) Oral two times a day  senna 2 Tablet(s) Oral at bedtime  sertraline 100 milliGRAM(s) Oral daily  sodium chloride 0.9% lock flush 3 milliLiter(s) IV Push every 8 hours  tamsulosin 0.4 milliGRAM(s) Oral at bedtime  traZODone 50 milliGRAM(s) Oral at bedtime    MEDICATIONS  (PRN):  acetaminophen     Tablet .. 650 milliGRAM(s) Oral every 6 hours PRN Temp greater or equal to 38C (100.4F), Mild Pain (1 - 3)  guaiFENesin Oral Liquid (Sugar-Free) 100 milliGRAM(s) Oral every 6 hours PRN Cough  heparin   Injectable 4000 Unit(s) IV Push every 6 hours PRN For aPTT less than 40  levalbuterol Inhalation 0.63 milliGRAM(s) Inhalation every 6 hours PRN Shortness of breath / wheezing      CAPILLARY BLOOD GLUCOSE      POCT Blood Glucose.: 103 mg/dL (11 Dec 2021 17:20)  POCT Blood Glucose.: 103 mg/dL (11 Dec 2021 12:13)  POCT Blood Glucose.: 105 mg/dL (11 Dec 2021 08:42)  POCT Blood Glucose.: 92 mg/dL (10 Dec 2021 23:36)    I&O's Summary    10 Dec 2021 07:01  -  11 Dec 2021 07:00  --------------------------------------------------------  IN: 120 mL / OUT: 540 mL / NET: -420 mL        PHYSICAL EXAM:    Vital Signs Last 24 Hrs  T(C): 36.9 (11 Dec 2021 11:11), Max: 36.9 (11 Dec 2021 11:11)  T(F): 98.5 (11 Dec 2021 11:11), Max: 98.5 (11 Dec 2021 11:11)  HR: 59 (11 Dec 2021 11:11) (59 - 71)  BP: 122/60 (11 Dec 2021 11:11) (121/67 - 139/69)  BP(mean): --  RR: 18 (11 Dec 2021 11:11) (18 - 18)  SpO2: 98% (11 Dec 2021 11:11) (96% - 98%)    CONSTITUTIONAL: NAD, well-developed, obese  EYES: Conjunctiva and sclera clear  ENMT: Moist oral mucosa  RESPIRATORY: Normal respiratory effort; lungs are clear to auscultation bilaterally  CARDIOVASCULAR: Regular rate and rhythm, normal S1 and S2, no murmur/rub/gallop; No lower extremity edema  ABDOMEN: Nontender to palpation, normoactive bowel sounds  PSYCH: A+O to person, place  NEUROLOGY: No focal deficits  SKIN: No rashes; no palpable lesions    LABS:                        12.1   5.18  )-----------( 196      ( 11 Dec 2021 07:27 )             40.3     12-11    141  |  103  |  25<H>  ----------------------------<  85  3.7   |  23  |  0.94    Ca    9.7      11 Dec 2021 07:27  Phos  2.6     12-11  Mg     1.60     12-11      PTT - ( 11 Dec 2021 17:31 )  PTT:64.0 sec  CARDIAC MARKERS ( 11 Dec 2021 17:31 )  x     / x     / x     / x     / 9.6 ng/mL  CARDIAC MARKERS ( 10 Dec 2021 00:46 )  x     / x     / 146 U/L / x     / 9.8 ng/mL  CARDIAC MARKERS ( 09 Dec 2021 20:59 )  x     / x     / 153 U/L / x     / x        RADIOLOGY & ADDITIONAL TESTS: No new imaging  Results Reviewed: Yes  Imaging Personally Reviewed:  Electrocardiogram Personally Reviewed:    COORDINATION OF CARE:  Care Discussed with Consultants/Other Providers [Y/N]:  Prior or Outpatient Records Reviewed [Y/N]:

## 2021-12-14 NOTE — PROGRESS NOTE ADULT - PROBLEM SELECTOR PLAN 2
Psych consulted, appreciate recs  No need for 1:1, d/carissa  C/w home Valium 5mg PO BID PRN, trazodone 50mg qHS, melatonin 3mg PO qHS
Psych consulted, appreciate recs  No need for 1:1, d/carissa, no active SI, In good spirits, wants to go home  c/w zoloft 100mg qd, home Valium 5mg PO BID prn, trazodone 50mg qHS prn, melatonin 3mg PO qHs
Psych consulted, appreciate recs  No need for 1:1, d/carissa  C/w home Valium 5mg PO BID, trazodone 50mg qHS, melatonin 3mg PO qHS
Psych consulted, appreciate recs  No need for 1:1, d/carissa  C/w home Valium 5mg PO BID PRN, trazodone 50mg qHS, melatonin 3mg PO qHS
Psych consulted, appreciate recs  No need for 1:1, d/carissa, no active SI, In good spirits, wants to go home  c/w zoloft 100mg qd, home Valium 5mg PO BID prn, trazodone 50mg qHS prn, melatonin 3mg PO qHs

## 2021-12-14 NOTE — PROGRESS NOTE ADULT - PROBLEM SELECTOR PLAN 10
BP well controlled  Low salt diet.  C/w losartan
LDL close to goal  C/w atorvastatin
BP well controlled  Low salt diet.  C/w losartan

## 2021-12-14 NOTE — PROGRESS NOTE ADULT - PROBLEM SELECTOR PROBLEM 4
Urinary hesitancy
2019 novel coronavirus disease (COVID-19)

## 2021-12-14 NOTE — PROGRESS NOTE ADULT - PROBLEM SELECTOR PLAN 6
Continue Diazepam 5 mg po BID PRN
Per spouse, pt's last BM may have been weeks ago, may be contributing to urinary hesitancy  - Constipation likely due to Oxy IR for OA  - C/w Miralax and Senna, Dulcolax PO  - Dulcolax suppository prn  - Tap water enema if patient does not have a BM with suppository
Per spouse, pt's last BM may have been weeks ago, may be contributing to urinary hesitancy  - Constipation likely due to Oxy IR for OA  - C/w Miralax and Senna, Dulcolax PO  - Dulcolax suppository ordered  - Tap water enema if patient does not have a BM with suppository
Per spouse, pt's last BM may have been weeks ago, may be contributing to urinary hesitancy  - Constipation likely due to Oxy IR for OA  - C/w Miralax and Senna, add Dulcolax  - Monitor stool count
Per spouse, pt's last BM may have been weeks ago, may be contributing to urinary hesitancy  - Constipation likely due to Oxy IR for OA  - C/w Miralax and Senna, Dulcolax PO  - Dulcolax suppository prn  - Tap water enema if patient does not have a BM with suppository

## 2021-12-14 NOTE — PROGRESS NOTE ADULT - PROBLEM SELECTOR PLAN 5
Patient with difficulty urinating into toilet and urinal, states he feels he has to go but nothing comes out  - Bladder scan without evidence of urinary retention  - UA negative  - C/w bowel regimen
Patient with difficulty urinating into toilet and urinal, states he feels he has to go but nothing comes out  - Bladder scan without evidence of urinary retention  - Check UA
Per spouse, pt's last BM may have been weeks ago.   - Constipation likely due to Oxy IR for OA  - C/w Miralax and Senna

## 2021-12-14 NOTE — PROGRESS NOTE ADULT - PROBLEM SELECTOR PLAN 11
LDL close to goal  C/w atorvastatin
patient not complaining of pain right now, hold opiates and nsaids for now

## 2021-12-14 NOTE — PROGRESS NOTE ADULT - PROBLEM SELECTOR PLAN 9
BP well controlled  Low salt diet.  C/w losartan
A1c 6.1%  FS QID  ISS  Consistent carb diet

## 2021-12-14 NOTE — PROGRESS NOTE ADULT - PROBLEM SELECTOR PLAN 12
DVT ppx: Lovenox  Diet: consistent carb, DASH  PT recommending ARVIND- will d/w patient and his wife
DVT ppx: Lovenox  Diet: consistent carb, DASH  PT recommending ARVIND- patient's wife leaning toward home with PT, requesting to speak to SW on Monday
DVT ppx: Lovenox  Diet: consistent carb, DASH
DVT ppx: Lovenox  Diet: consistent carb, DASH
VTW covered with Heparin drip.  Fall, Aspiration, safety and seizure precautions.

## 2021-12-14 NOTE — PLAN
[FreeTextEntry1] : Covid -19: Pt s/p hospitalization for Covid PNA. NSTEMI, SI. Con benzonatate for cough prn. Hx of exposure to asbestos with pleural plaques on CT chest- they will f/u with Dr. Dawson for ongoing eval. Re. Xarelto, can d/c prophylaxis when he is ambulatory at home. Reinforced psych and cardiology f/u\par \par \par F/u prn\par \par I, Silvia Ruiz, NP, am scribing for and in the presence of Dr. Boris Naranjo, the following sections HISTORY OF PRESENT ILLNESS, PAST MEDICAL/FAMILY/SOCIAL HISTORY; REVIEW OF SYSTEMS; VITAL SIGNS; PHYSICAL EXAM; DISPOSITION.\par

## 2021-12-14 NOTE — PROGRESS NOTE ADULT - PROBLEM SELECTOR PROBLEM 1
NSTEMI (non-ST elevation myocardial infarction)

## 2021-12-14 NOTE — PROGRESS NOTE ADULT - PROBLEM SELECTOR PLAN 3
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   - CTPA with findings of few bilateral upper lung ill-defined small groundglass nodular opacities and pleural plaques  new since 6/17/2016 may represent small foci of infection/ inflammation.  - C/w Xopenex Nebs Q6h PRN  - Also with cough, likely post-viral, c/w tessalon perles and Robitussin PRN  - Not hypoxic, continue to monitor
Likely due to anxiety with cardiac component, COVID PNA 11/20, history of asbestos exposure.   - CTPA with findings of few bilateral upper lung ill-defined small groundglass nodular opacities and pleural plaques  new since 6/17/2016 may represent small foci of infection/ inflammation.  - C/w Xopenex Nebs Q6h PRN  - Also with cough, likely post-viral, c/w tessalon perles and Robitussin PRN  - Not hypoxic, continue to monitor
Likely due to anxiety with cardiac component, COVID PNA 11/20, history of asbestos exposure.   - CTPA with findings of few bilateral upper lung ill-defined small groundglass nodular opacities and pleural plaques  new since 6/17/2016 may represent small foci of infection/ inflammation.  - C/w Xopenex Nebs Q6h PRN  - Also with cough, likely post-viral, c/w tessalon perles and Robitussin PRN  - Not hypoxic, continue to monitor
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   - CTPA with findings of few bilateral upper lung ill-defined small groundglass nodular opacities and pleural plaques  new since 6/17/2016 may represent small foci of infection/ inflammation.  - C/w Xopenex Nebs Q6h PRN  - Also with cough, likely post-viral, c/w tessalon perles and Robitussin PRN  - Not hypoxic, continue to monitor  -Dispo: declined rehab, dc home today with home PT  time spent on discharge 32 minutes coordinating discharge plan and discussing with patient and family. Updated wife on 12/13
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   - CTPA with findings of few bilateral upper lung ill-defined small groundglass nodular opacities and pleural plaques  new since 6/17/2016 may represent small foci of infection/ inflammation.  - C/w Xopenex Nebs Q6h PRN  - Also with cough, likely post-viral, c/w tessalon perles and Robitussin PRN  - Not hypoxic, continue to monitor  -Dispo: PT recs rehab, but patient and his wife want to take him home with home PT, d/w CM  Time spent on discharge 32 minutes coordinating discharge plan and discussing with patient and family. Updated wife on 12/13

## 2021-12-14 NOTE — PROGRESS NOTE ADULT - PROBLEM SELECTOR PROBLEM 9
Diabetes mellitus, type 2
Hypertension
Diabetes mellitus, type 2

## 2021-12-14 NOTE — PROGRESS NOTE ADULT - PROBLEM SELECTOR PLAN 4
COVID PCR positive on admission, however patient previously had COVID last month  -Not currently symptomatic, afebrile  -last covid + on 12/9, likely asymptomatic viral shedding. Satting well on RA  -D/w infection control on Monday if patient needs to remain on isolation
Patient with difficulty urinating into toilet and urinal, states he feels he has to go but nothing comes out  - Bladder scan  - Straight cath protocol  - Check UA  - If patient found to have urinary retention, will start tamsulosin for likely BPH
COVID PCR positive on admission, however patient previously had COVID last month  -Not currently symptomatic, afebrile  -D/w infection control on Monday if patient needs to remain on isolation
COVID PCR positive on admission, however patient previously had COVID last month  -Not currently symptomatic, afebrile  -D/w infection control on Monday if patient needs to remain on isolation
COVID PCR positive on admission, however patient previously had COVID last month  -Not currently symptomatic, afebrile  -last covid + on 12/9, likely asymptomatic viral shedding. Satting well on RA  -D/w infection control on Monday if patient needs to remain on isolation

## 2021-12-21 ENCOUNTER — APPOINTMENT (OUTPATIENT)
Dept: CARE COORDINATION | Facility: HOME HEALTH | Age: 76
End: 2021-12-21
Payer: MEDICARE

## 2021-12-21 DIAGNOSIS — I21.4 NON-ST ELEVATION (NSTEMI) MYOCARDIAL INFARCTION: ICD-10-CM

## 2021-12-21 DIAGNOSIS — R45.851 SUICIDAL IDEATIONS: ICD-10-CM

## 2021-12-21 DIAGNOSIS — F41.9 ANXIETY DISORDER, UNSPECIFIED: ICD-10-CM

## 2021-12-21 DIAGNOSIS — F32.A ANXIETY DISORDER, UNSPECIFIED: ICD-10-CM

## 2021-12-21 DIAGNOSIS — I10 ESSENTIAL (PRIMARY) HYPERTENSION: ICD-10-CM

## 2021-12-21 DIAGNOSIS — E78.5 HYPERLIPIDEMIA, UNSPECIFIED: ICD-10-CM

## 2021-12-21 PROCEDURE — 99495 TRANSJ CARE MGMT MOD F2F 14D: CPT | Mod: 95

## 2021-12-21 RX ORDER — SERTRALINE HYDROCHLORIDE 100 MG/1
100 TABLET, FILM COATED ORAL DAILY
Refills: 0 | Status: ACTIVE | COMMUNITY
Start: 2021-12-21

## 2021-12-21 RX ORDER — LOSARTAN POTASSIUM 100 MG/1
100 TABLET, FILM COATED ORAL
Qty: 1 | Refills: 3 | Status: ACTIVE | COMMUNITY
Start: 2021-12-21

## 2021-12-21 RX ORDER — DULAGLUTIDE 1.5 MG/.5ML
1.5 INJECTION, SOLUTION SUBCUTANEOUS
Qty: 4 | Refills: 11 | Status: DISCONTINUED | COMMUNITY
Start: 2017-10-18 | End: 2021-12-21

## 2021-12-21 RX ORDER — TAMSULOSIN HYDROCHLORIDE 0.4 MG/1
0.4 CAPSULE ORAL
Refills: 0 | Status: ACTIVE | COMMUNITY
Start: 2021-12-21

## 2021-12-21 RX ORDER — DIAZEPAM 5 MG/1
5 TABLET ORAL TWICE DAILY
Refills: 0 | Status: ACTIVE | COMMUNITY
Start: 2021-12-21

## 2021-12-21 RX ORDER — METOPROLOL SUCCINATE 25 MG/1
25 TABLET, EXTENDED RELEASE ORAL
Qty: 30 | Refills: 0 | Status: ACTIVE | COMMUNITY
Start: 2021-12-21

## 2021-12-21 RX ORDER — TRAZODONE HYDROCHLORIDE 50 MG/1
50 TABLET ORAL
Refills: 0 | Status: ACTIVE | COMMUNITY
Start: 2021-12-21

## 2021-12-21 NOTE — HISTORY OF PRESENT ILLNESS
[Home] : at home, [unfilled] , at the time of the visit. [Other Location: e.g. Home (Enter Location, City,State)___] : at [unfilled] [Spouse] : spouse [Verbal consent obtained from patient] : the patient, [unfilled] [FreeTextEntry2] : 75M history of OA, HTN, DM2, Hyperlipidemia, Depression, Anxiety, admitted for SI, chest pain with elevated Troponin.\par \par Since dc, he has been feeling ok.  Wife states he spends a lot of time in bed and she has to force him to get out of bed.  He denies any cp/sob/pnd.  +PND and bendopnea.  He has a f/u appt with Dr. Funk who is his PMD/cardio.  Was dx with Covid on 11/20, symptoms have been improving, but he does have a productive clear sputum cough that is not relieved or exacerbated by anything.  I suggested he take mucinex.  Pulse ox and BP cuff requested from TCM for pt, and TCM numbers given for any questions or concerns

## 2021-12-21 NOTE — PHYSICAL EXAM
[No Acute Distress] : no acute distress [No Respiratory Distress] : no respiratory distress  [de-identified] : per telehealth

## 2021-12-21 NOTE — REVIEW OF SYSTEMS
[Dyspnea on Exertion] : dyspnea on exertion [Anxiety] : anxiety [Depression] : depression [Negative] : Heme/Lymph

## 2022-01-18 ENCOUNTER — APPOINTMENT (OUTPATIENT)
Dept: PULMONOLOGY | Facility: CLINIC | Age: 77
End: 2022-01-18
Payer: MEDICARE

## 2022-01-18 VITALS
WEIGHT: 240 LBS | BODY MASS INDEX: 31.81 KG/M2 | HEIGHT: 73 IN | RESPIRATION RATE: 16 BRPM | HEART RATE: 76 BPM | OXYGEN SATURATION: 96 % | DIASTOLIC BLOOD PRESSURE: 74 MMHG | SYSTOLIC BLOOD PRESSURE: 108 MMHG | TEMPERATURE: 98 F

## 2022-01-18 DIAGNOSIS — U07.1 COVID-19: ICD-10-CM

## 2022-01-18 PROCEDURE — 99214 OFFICE O/P EST MOD 30 MIN: CPT | Mod: CS

## 2022-01-18 RX ORDER — ALBUTEROL SULFATE 90 UG/1
108 (90 BASE) INHALANT RESPIRATORY (INHALATION)
Qty: 1 | Refills: 3 | Status: ACTIVE | COMMUNITY
Start: 2022-01-18 | End: 1900-01-01

## 2022-01-18 NOTE — PHYSICAL EXAM
[No Acute Distress] : no acute distress [Normal Rate/Rhythm] : normal rate/rhythm [No Resp Distress] : no resp distress [Clear to Auscultation Bilaterally] : clear to auscultation bilaterally [No Edema] : no edema [Oriented x3] : oriented x3

## 2022-01-18 NOTE — HISTORY OF PRESENT ILLNESS
[TextBox_4] : Pt here for follow up Covid 19. Still c/o SOB with exertion most notable when bending over. Cough resolved but notes increased secretions. No longer on albuterol/ benzonatate.\par \par \par pleural plaques  on CT \par \par \par EXAM: CT ANGIO CHEST PULM ART WAWI\par PROCEDURE DATE: 12/09/202q\par INTERPRETATION: CLINICAL INFORMATION: Chest tightness with hypoxia, concern for pulmonary embolism\par COMPARISON: CTA chest from 6/17/2016, chest radiograph from earlier the same day\par \par FINDINGS:\par LUNGS AND AIRWAYS: Patent central airways. Right lung base areas of atelectasis adjacent to the elevated diaphragm within the right lower lobe and the right middle lobe.\par \par Subtle few bilateral mid to upper lung ill-defined groundglass small nodular opacities, most notable within the peripheral aspect of the right upper lobe are new since 2/17/2016.\par \par PLEURA: Bilateral pleural plaques.\par MEDIASTINUM AND STORMY: No lymphadenopathy.\par VESSELS: Limited evaluation of some of the subsegmental pulmonary arterial branches at the left lung base due to poor contrast opacification. No pulmonary arterial emboli within the well visualized and opacified pulmonary arteries. Coronary artery and aortic calcifications. Aortic valve calcifications.\par HEART: Heart size is normal. No pericardial effusion.\par CHEST WALL AND LOWER NECK: Bilateral gynecomastia, right greater than left as on the prior study.\par VISUALIZED UPPER ABDOMEN: Moderate to severe elevation of the right hemidiaphragm, unchanged. Status post sleeve gastrectomy.\par BONES: Degenerative changes. 8 mm sclerotic focus within the T5 vertebral body is unchanged.\par \par IMPRESSION:\par Limited evaluation of some of the small subsegmental pulmonary arterial branches due to poor contrast opacification. No pulmonary arterial emboli within the well opacified pulmonary arteries.\par Few bilateral upper lung ill-defined small groundglass nodular opacities new since 6/17/2016 may represent small foci of infection/inflammation. A 3 month follow-up noncontrast chest CT is recommended to ensure resolution.\par \par \par \par Never smoker\par

## 2022-01-18 NOTE — ASSESSMENT
[FreeTextEntry1] : Post Covid 19- : check PFT. Con albuterol inhaler. \par \par Pleural plaquest: with occupational asbestos exposure working at [employer]- repeat CT in March to follow changes.\par \par RTC in March\par \par Silvia CHAPA NP, am scribing for and in the presence of Dr. Boris Naranjo, the following sections HISTORY OF PRESENT ILLNESS, PAST MEDICAL/FAMILY/SOCIAL HISTORY; REVIEW OF SYSTEMS; VITAL SIGNS; PHYSICAL EXAM; DISPOSITION.\par \par

## 2022-02-15 RX ORDER — BLOOD SUGAR DIAGNOSTIC
STRIP MISCELLANEOUS
Qty: 100 | Refills: 2 | Status: ACTIVE | COMMUNITY
Start: 2017-11-01 | End: 1900-01-01

## 2022-03-14 ENCOUNTER — APPOINTMENT (OUTPATIENT)
Dept: CT IMAGING | Facility: IMAGING CENTER | Age: 77
End: 2022-03-14
Payer: MEDICARE

## 2022-03-14 ENCOUNTER — OUTPATIENT (OUTPATIENT)
Dept: OUTPATIENT SERVICES | Facility: HOSPITAL | Age: 77
LOS: 1 days | End: 2022-03-14
Payer: MEDICARE

## 2022-03-14 DIAGNOSIS — Z96.652 PRESENCE OF LEFT ARTIFICIAL KNEE JOINT: Chronic | ICD-10-CM

## 2022-03-14 DIAGNOSIS — Z98.84 BARIATRIC SURGERY STATUS: Chronic | ICD-10-CM

## 2022-03-14 DIAGNOSIS — Z98.890 OTHER SPECIFIED POSTPROCEDURAL STATES: Chronic | ICD-10-CM

## 2022-03-14 DIAGNOSIS — J92.0 PLEURAL PLAQUE WITH PRESENCE OF ASBESTOS: ICD-10-CM

## 2022-03-14 PROCEDURE — 82565 ASSAY OF CREATININE: CPT

## 2022-03-14 PROCEDURE — 71260 CT THORAX DX C+: CPT | Mod: 26,MG

## 2022-03-14 PROCEDURE — G1004: CPT

## 2022-03-14 PROCEDURE — 71260 CT THORAX DX C+: CPT | Mod: MG

## 2022-03-15 ENCOUNTER — NON-APPOINTMENT (OUTPATIENT)
Age: 77
End: 2022-03-15

## 2022-03-23 ENCOUNTER — APPOINTMENT (OUTPATIENT)
Dept: PULMONOLOGY | Facility: CLINIC | Age: 77
End: 2022-03-23
Payer: MEDICARE

## 2022-03-23 VITALS
DIASTOLIC BLOOD PRESSURE: 64 MMHG | SYSTOLIC BLOOD PRESSURE: 110 MMHG | HEIGHT: 69 IN | OXYGEN SATURATION: 96 % | BODY MASS INDEX: 36.43 KG/M2 | TEMPERATURE: 98 F | WEIGHT: 246 LBS | HEART RATE: 76 BPM

## 2022-03-23 DIAGNOSIS — R06.02 SHORTNESS OF BREATH: ICD-10-CM

## 2022-03-23 DIAGNOSIS — J92.0 PLEURAL PLAQUE WITH PRESENCE OF ASBESTOS: ICD-10-CM

## 2022-03-23 PROCEDURE — 94729 DIFFUSING CAPACITY: CPT

## 2022-03-23 PROCEDURE — 94726 PLETHYSMOGRAPHY LUNG VOLUMES: CPT

## 2022-03-23 PROCEDURE — ZZZZZ: CPT

## 2022-03-23 PROCEDURE — 99214 OFFICE O/P EST MOD 30 MIN: CPT | Mod: 25

## 2022-03-23 PROCEDURE — 94010 BREATHING CAPACITY TEST: CPT

## 2022-03-23 NOTE — ASSESSMENT
[FreeTextEntry1] : Post Covid 19- : Repeat CT  this month shows resolution of the ggo. \par \par Pleural plaquest: with occupational asbestos exposure working at [facility]-  PFTs show moderate restrictive disease.\par \par RTC prn\par \par \par

## 2022-04-26 ENCOUNTER — APPOINTMENT (OUTPATIENT)
Dept: PULMONOLOGY | Facility: CLINIC | Age: 77
End: 2022-04-26

## 2022-05-21 NOTE — BH CONSULTATION LIAISON ASSESSMENT NOTE - DESCRIPTION
Pt lives in a house in [place] and mentions that he and his wife lives upstairs while his daughter and grandchildren live downstairs. Retired [employer] worker.  89

## 2022-09-07 NOTE — PHYSICAL THERAPY INITIAL EVALUATION ADULT - HEALTH SCREEN CRITERIA
Confirmed 2 identifiers. Pt. Had left a perfect serve message regarding FMLA paperwork. Pt. Asking if she can drop off paperwork tomorrow to be done immediately. RN explained we have a 7-10 business day turnaround on FMLA paperwork so pt. May drop it off but cannot wait for it. Pt. Can put the date it's due on the paperwork and to ensure her name and  and all of her parts have been completed prior to handing it in. Pt. Verbalizes understanding.   
yes

## 2022-10-03 RX ORDER — LANCETS 33 GAUGE
EACH MISCELLANEOUS
Qty: 1 | Refills: 3 | Status: ACTIVE | COMMUNITY
Start: 2019-01-15 | End: 1900-01-01

## 2023-03-27 NOTE — PATIENT PROFILE ADULT - FALL HARM RISK - DEVICES
----- Message from Brooklyn Saleem sent at 3/23/2018  2:28 PM CDT -----  Contact: pt daughter Isis  Daughter canceled her mother appt because she couldn't get her to come, now she want to know if she can still be seen today she can't wait on the weekend, she can be reached at [phone] Thanks   Left message to call clinic back.     None Polish

## 2023-06-07 NOTE — ED ADULT NURSE NOTE - NSIMPLEMENTINTERV_GEN_ALL_ED
person/place Implemented All Fall with Harm Risk Interventions:  [place] to call system. Call bell, personal items and telephone within reach. Instruct patient to call for assistance. Room bathroom lighting operational. Non-slip footwear when patient is off stretcher. Physically safe environment: no spills, clutter or unnecessary equipment. Stretcher in lowest position, wheels locked, appropriate side rails in place. Provide visual cue, wrist band, yellow gown, etc. Monitor gait and stability. Monitor for mental status changes and reorient to person, place, and time. Review medications for side effects contributing to fall risk. Reinforce activity limits and safety measures with patient and family. Provide visual clues: red socks.

## 2025-04-22 NOTE — H&P ADULT - LYMPH NODES
[facility] provides services at a reduced cost to those who are determined to be eligible through [facility]’s financial assistance program. Information regarding [facility]’s financial assistance program can be found by going to [email or web address] or by calling [phone]. not examined